# Patient Record
Sex: FEMALE | Race: WHITE | NOT HISPANIC OR LATINO | Employment: UNEMPLOYED | URBAN - METROPOLITAN AREA
[De-identification: names, ages, dates, MRNs, and addresses within clinical notes are randomized per-mention and may not be internally consistent; named-entity substitution may affect disease eponyms.]

---

## 2017-01-17 RX ORDER — LEVOTHYROXINE SODIUM 88 UG/1
88 TABLET ORAL DAILY
Qty: 90 TABLET | Refills: 1 | Status: SHIPPED | OUTPATIENT
Start: 2017-01-17 | End: 2017-02-02 | Stop reason: SDUPTHER

## 2017-02-02 RX ORDER — LEVOTHYROXINE SODIUM 88 UG/1
88 TABLET ORAL DAILY
Qty: 90 TABLET | Refills: 1 | Status: SHIPPED | OUTPATIENT
Start: 2017-02-02 | End: 2017-03-07 | Stop reason: SDUPTHER

## 2017-03-07 RX ORDER — LEVOTHYROXINE SODIUM 88 UG/1
88 TABLET ORAL DAILY
Qty: 90 TABLET | Refills: 0 | Status: SHIPPED | OUTPATIENT
Start: 2017-03-07 | End: 2017-06-08 | Stop reason: SDUPTHER

## 2017-06-08 ENCOUNTER — OFFICE VISIT (OUTPATIENT)
Dept: ENDOCRINOLOGY | Facility: CLINIC | Age: 52
End: 2017-06-08

## 2017-06-08 VITALS
BODY MASS INDEX: 24.88 KG/M2 | OXYGEN SATURATION: 98 % | HEART RATE: 80 BPM | HEIGHT: 69 IN | DIASTOLIC BLOOD PRESSURE: 80 MMHG | SYSTOLIC BLOOD PRESSURE: 130 MMHG | WEIGHT: 168 LBS

## 2017-06-08 DIAGNOSIS — E03.9 ACQUIRED HYPOTHYROIDISM: ICD-10-CM

## 2017-06-08 DIAGNOSIS — E78.2 MIXED HYPERLIPIDEMIA: Primary | ICD-10-CM

## 2017-06-08 DIAGNOSIS — E83.52 HYPERCALCEMIA: ICD-10-CM

## 2017-06-08 DIAGNOSIS — I10 ESSENTIAL HYPERTENSION: ICD-10-CM

## 2017-06-08 LAB
25(OH)D3 SERPL-MCNC: 27 NG/ML
ALBUMIN SERPL-MCNC: 4.7 G/DL (ref 3.2–4.8)
ALBUMIN/GLOB SERPL: 1.6 G/DL (ref 1.5–2.5)
ALP SERPL-CCNC: 88 U/L (ref 25–100)
ALT SERPL W P-5'-P-CCNC: 85 U/L (ref 7–40)
ANION GAP SERPL CALCULATED.3IONS-SCNC: 2 MMOL/L (ref 3–11)
ARTICHOKE IGE QN: 119 MG/DL (ref 0–130)
AST SERPL-CCNC: 40 U/L (ref 0–33)
BILIRUB SERPL-MCNC: 0.6 MG/DL (ref 0.3–1.2)
BUN BLD-MCNC: 16 MG/DL (ref 9–23)
BUN/CREAT SERPL: 20 (ref 7–25)
CA-I SERPL ISE-MCNC: 1.31 MMOL/L (ref 1.12–1.32)
CALCIUM SPEC-SCNC: 10.8 MG/DL (ref 8.7–10.4)
CHLORIDE SERPL-SCNC: 106 MMOL/L (ref 99–109)
CHOLEST SERPL-MCNC: 212 MG/DL (ref 0–200)
CO2 SERPL-SCNC: 33 MMOL/L (ref 20–31)
CREAT BLD-MCNC: 0.8 MG/DL (ref 0.6–1.3)
GFR SERPL CREATININE-BSD FRML MDRD: 75 ML/MIN/1.73
GLOBULIN UR ELPH-MCNC: 2.9 GM/DL
GLUCOSE BLD-MCNC: 88 MG/DL (ref 70–100)
HDLC SERPL-MCNC: 70 MG/DL (ref 40–60)
POTASSIUM BLD-SCNC: 4.8 MMOL/L (ref 3.5–5.5)
PROT SERPL-MCNC: 7.6 G/DL (ref 5.7–8.2)
SODIUM BLD-SCNC: 141 MMOL/L (ref 132–146)
T4 FREE SERPL-MCNC: 1.46 NG/DL (ref 0.89–1.76)
TRIGL SERPL-MCNC: 101 MG/DL (ref 0–150)
TSH SERPL DL<=0.05 MIU/L-ACNC: 0.42 MIU/ML (ref 0.35–5.35)

## 2017-06-08 PROCEDURE — 83970 ASSAY OF PARATHORMONE: CPT | Performed by: INTERNAL MEDICINE

## 2017-06-08 PROCEDURE — 80061 LIPID PANEL: CPT | Performed by: INTERNAL MEDICINE

## 2017-06-08 PROCEDURE — 84443 ASSAY THYROID STIM HORMONE: CPT | Performed by: INTERNAL MEDICINE

## 2017-06-08 PROCEDURE — 84439 ASSAY OF FREE THYROXINE: CPT | Performed by: INTERNAL MEDICINE

## 2017-06-08 PROCEDURE — 82330 ASSAY OF CALCIUM: CPT | Performed by: INTERNAL MEDICINE

## 2017-06-08 PROCEDURE — 82306 VITAMIN D 25 HYDROXY: CPT | Performed by: INTERNAL MEDICINE

## 2017-06-08 PROCEDURE — 99214 OFFICE O/P EST MOD 30 MIN: CPT | Performed by: INTERNAL MEDICINE

## 2017-06-08 PROCEDURE — 80053 COMPREHEN METABOLIC PANEL: CPT | Performed by: INTERNAL MEDICINE

## 2017-06-08 RX ORDER — LORATADINE 10 MG/1
CAPSULE, LIQUID FILLED ORAL
COMMUNITY
End: 2018-05-18

## 2017-06-08 RX ORDER — LEVOTHYROXINE SODIUM 88 UG/1
88 TABLET ORAL DAILY
Qty: 90 TABLET | Refills: 1 | Status: SHIPPED | OUTPATIENT
Start: 2017-06-08 | End: 2018-01-27 | Stop reason: SDUPTHER

## 2017-06-08 NOTE — PROGRESS NOTES
Dory Mccord 52 y.o.  CC:Follow-up and Hypothyroidism    [place]: Follow-up and Hypothyroidism    Doing well overall  Energy is good   Had high calcium at last visit   Fewer palpitations  Is watching diet, monitoring and doing cardio    Allergies   Allergen Reactions   • Oxycodone-Acetaminophen Itching       Current Outpatient Prescriptions:   •  Cyanocobalamin ER (RA VITAMIN B-12 TR) 1000 MCG tablet controlled-release, Take  by mouth., Disp: , Rfl:   •  diclofenac (VOLTAREN) 75 MG EC tablet, TAKE 1 TABLET BY MOUTH 2 TIMES A DAY AS NEEDED FOR PAIN, Disp: , Rfl: 2  •  esomeprazole (NEXIUM) 20 MG capsule, Take  by mouth., Disp: , Rfl:   •  fluticasone (FLONASE) 50 MCG/ACT nasal spray, into each nostril daily., Disp: , Rfl:   •  levothyroxine (SYNTHROID, LEVOTHROID) 88 MCG tablet, Take 1 tablet by mouth Daily., Disp: 90 tablet, Rfl: 0  •  Loratadine (CLARITIN) 10 MG capsule, Take  by mouth., Disp: , Rfl:   •  LORazepam (ATIVAN) 0.5 MG tablet, Take  by mouth., Disp: , Rfl:   •  Cholecalciferol (VITAMIN D) 1000 UNITS tablet, Take  by mouth., Disp: , Rfl:   Patient Active Problem List    Diagnosis   • Acute stress disorder [F43.0]   • Atrial premature depolarization [I49.1]     Overview Note:     Impression: 06/09/2015 - discussed with her, better off lisinopril; Description: no excess caffeine, normal stress test, normal holter o/t PACs, echo valvular heart dz     • Chest pain [R07.9]     Overview Note:     Description: 6/15 - stress test normal     • Hiatal hernia [K44.9]   • Hyperlipidemia [E78.5]     Overview Note:     Impression: 12/01/2015 - check flp  Impression: 12/10/2014 - discussed flp  mother CABG x 4 , all her side of family has had cad;      • Hypertension [I10]     Overview Note:     Impression: 12/01/2015 - bp is good today  Impression: 12/10/2014 - bp is excellent  Impression: 06/10/2014 - bp is good;      • Hypothyroidism [E03.9]     Overview Note:     Impression: 12/01/2015 - check tfts  Impression:  06/09/2015 - check tfts  Impression: 12/10/2014 - check tft  Impression: 06/10/2014 - check tft;      • Irritable bowel syndrome [K58.9]   • Uterine leiomyoma [D25.9]   • Mass of breast [N63]     Overview Note:     Description: p/o biopsy 8/6     • Calculus of kidney [N20.0]   • Obstructive sleep apnea syndrome [G47.33]     Overview Note:     Impression: 12/01/2015 - with snoring and poss apnea- she will call to get back in  to get results    discussed nasal spray and strips;      • Cyst of ovary [N83.209]   • Palpitations [R00.2]     Overview Note:     Impression: 12/01/2015 - due to apnea?; Description: s/p holter 6/15 - per cardio - try beat blocker     • Seasonal allergic rhinitis [J30.2]     Overview Note:     Impression: 06/10/2014 - use nasalcort, zyrtec;      • Vitamin D deficiency [E55.9]     Review of Systems   Constitutional: Negative for activity change, appetite change, chills, diaphoresis, fatigue, fever and unexpected weight change.   HENT: Negative for congestion, dental problem, drooling, ear discharge, ear pain, facial swelling, hearing loss, mouth sores, nosebleeds, postnasal drip, rhinorrhea, sinus pressure, sneezing, sore throat, tinnitus, trouble swallowing and voice change.    Eyes: Negative for photophobia, pain, discharge, redness, itching and visual disturbance.   Respiratory: Negative for apnea, cough, choking, chest tightness, shortness of breath, wheezing and stridor.    Cardiovascular: Negative for chest pain, palpitations and leg swelling.   Gastrointestinal: Negative for abdominal distention, abdominal pain, anal bleeding, blood in stool, constipation, diarrhea, nausea, rectal pain and vomiting.   Endocrine: Negative for cold intolerance, heat intolerance, polydipsia, polyphagia and polyuria.   Genitourinary: Negative for decreased urine volume, difficulty urinating, dysuria, enuresis, flank pain, frequency, genital sores, hematuria and urgency.   Musculoskeletal: Negative for  "arthralgias, back pain, gait problem, joint swelling, myalgias, neck pain and neck stiffness.   Skin: Negative for color change, pallor, rash and wound.   Allergic/Immunologic: Negative for environmental allergies, food allergies and immunocompromised state.   Neurological: Negative for dizziness, tremors, seizures, syncope, facial asymmetry, speech difficulty, weakness, light-headedness, numbness and headaches.   Hematological: Negative for adenopathy. Does not bruise/bleed easily.   Psychiatric/Behavioral: Negative for agitation, behavioral problems, confusion, decreased concentration, dysphoric mood, hallucinations, self-injury, sleep disturbance and suicidal ideas. The patient is not nervous/anxious and is not hyperactive.      Social History     Social History   • Marital status:      Spouse name: N/A   • Number of children: N/A   • Years of education: N/A     Occupational History   • Not on file.     Social History Main Topics   • Smoking status: Former Smoker   • Smokeless tobacco: Not on file   • Alcohol use Not on file      Comment: 4 drinks per week   • Drug use: No   • Sexual activity: Defer     Other Topics Concern   • Not on file     Social History Narrative     Family History   Problem Relation Age of Onset   • Diabetes Mother    • Heart disease Mother    • Melanoma Mother    • Cancer Father    • Cancer Maternal Grandmother    • Arthritis Other    • Breast cancer Other    • Hyperlipidemia Other    • Hypertension Other    • Lung cancer Other    • Stroke Other      /80  Pulse 80  Ht 69\" (175.3 cm)  Wt 168 lb (76.2 kg)  SpO2 98%  BMI 24.81 kg/m2  Physical Exam   Constitutional: She is oriented to person, place, and time. She appears well-developed and well-nourished.   HENT:   Head: Normocephalic and atraumatic.   Nose: Nose normal.   Mouth/Throat: Oropharynx is clear and moist.   Eyes: Conjunctivae, EOM and lids are normal. Pupils are equal, round, and reactive to light.   Neck: Trachea " normal and normal range of motion. Neck supple. Carotid bruit is not present. No tracheal deviation present. No thyroid mass and no thyromegaly present.   Cardiovascular: Normal rate, regular rhythm, normal heart sounds and intact distal pulses.  Exam reveals no gallop and no friction rub.    No murmur heard.  Pulmonary/Chest: Effort normal and breath sounds normal. No respiratory distress. She has no wheezes.   Musculoskeletal: Normal range of motion. She exhibits no edema or deformity.   Lymphadenopathy:     She has no cervical adenopathy.   Neurological: She is alert and oriented to person, place, and time. She has normal reflexes. She displays normal reflexes. No cranial nerve deficit.   Skin: Skin is warm and dry. No rash noted. No cyanosis or erythema. Nails show no clubbing.   Psychiatric: She has a normal mood and affect. Her speech is normal and behavior is normal. Judgment and thought content normal. Cognition and memory are normal.   Nursing note and vitals reviewed.    Results for orders placed or performed in visit on 12/08/16   TSH   Result Value Ref Range    TSH 7.983 (H) 0.350 - 5.350 mIU/mL   T4, Free   Result Value Ref Range    Free T4 1.24 0.89 - 1.76 ng/dL   Comprehensive Metabolic Panel   Result Value Ref Range    Glucose 88 70 - 100 mg/dL    BUN 16 9 - 23 mg/dL    Creatinine 0.80 0.60 - 1.30 mg/dL    Sodium 140 132 - 146 mmol/L    Potassium 4.6 3.5 - 5.5 mmol/L    Chloride 105 99 - 109 mmol/L    CO2 26.0 20.0 - 31.0 mmol/L    Calcium 10.8 (H) 8.7 - 10.4 mg/dL    Total Protein 7.7 5.7 - 8.2 g/dL    Albumin 4.90 (H) 3.20 - 4.80 g/dL    ALT (SGPT) 42 (H) 7 - 40 U/L    AST (SGOT) 32 0 - 33 U/L    Alkaline Phosphatase 82 25 - 100 U/L    Total Bilirubin 0.5 0.3 - 1.2 mg/dL    eGFR Non African Amer 75 >60 mL/min/1.73    Globulin 2.8 gm/dL    A/G Ratio 1.8 g/dL    BUN/Creatinine Ratio 20.0 7.0 - 25.0    Anion Gap 9.0 3.0 - 11.0 mmol/L   Lipid Panel   Result Value Ref Range    Total Cholesterol 225 (H)  0 - 200 mg/dL    Triglycerides 76 0 - 150 mg/dL    HDL Cholesterol 74 (H) 40 - 60 mg/dL    LDL Cholesterol  139 (H) 0 - 130 mg/dL   Vitamin D 25 Hydroxy   Result Value Ref Range    25 Hydroxy, Vitamin D 30.8 ng/ml   CBC Auto Differential   Result Value Ref Range    WBC 5.14 3.50 - 10.80 10*3/mm3    RBC 4.60 3.89 - 5.14 10*6/mm3    Hemoglobin 13.9 11.5 - 15.5 g/dL    Hematocrit 40.9 34.5 - 44.0 %    MCV 88.9 80.0 - 99.0 fL    MCH 30.2 27.0 - 31.0 pg    MCHC 34.0 32.0 - 36.0 g/dL    RDW 12.3 11.3 - 14.5 %    RDW-SD 39.8 37.0 - 54.0 fl    MPV 11.3 6.0 - 12.0 fL    Platelets 210 150 - 450 10*3/mm3    Neutrophil % 54.6 41.0 - 71.0 %    Lymphocyte % 33.9 24.0 - 44.0 %    Monocyte % 9.3 0.0 - 12.0 %    Eosinophil % 1.2 0.0 - 3.0 %    Basophil % 0.8 0.0 - 1.0 %    Immature Grans % 0.2 0.0 - 0.6 %    Neutrophils, Absolute 2.81 1.50 - 8.30 10*3/mm3    Lymphocytes, Absolute 1.74 0.60 - 4.80 10*3/mm3    Monocytes, Absolute 0.48 0.00 - 1.00 10*3/mm3    Eosinophils, Absolute 0.06 (L) 0.10 - 0.30 10*3/mm3    Basophils, Absolute 0.04 0.00 - 0.20 10*3/mm3    Immature Grans, Absolute 0.01 0.00 - 0.03 10*3/mm3     Problem List Items Addressed This Visit        Cardiovascular and Mediastinum    Hyperlipidemia - Primary     Is following a low fat diet            Hypertension     bp is good today   Continue to monitor   She is not feeling extra beats currently   Is doing cardio 3-4 days a week          Relevant Orders    Lipid Panel       Endocrine    Hypothyroidism     Check tfts          Relevant Medications    levothyroxine (SYNTHROID, LEVOTHROID) 88 MCG tablet    Other Relevant Orders    TSH    T4, Free    Calcium, Ionized      Other Visit Diagnoses     Hypercalcemia        Relevant Orders    Comprehensive Metabolic Panel    Calcium, Ionized    PTH, Intact    Vitamin D 25 Hydroxy        Return in about 6 months (around 12/8/2017) for Recheck 30 min .    Peyton Jones MA  Signed Berta Loja MD

## 2017-06-08 NOTE — ASSESSMENT & PLAN NOTE
bp is good today   Continue to monitor   She is not feeling extra beats currently   Is doing cardio 3-4 days a week

## 2017-06-09 LAB — PTH-INTACT SERPL-MCNC: 48 PG/ML (ref 15–65)

## 2017-12-05 ENCOUNTER — TELEPHONE (OUTPATIENT)
Dept: INTERNAL MEDICINE | Facility: CLINIC | Age: 52
End: 2017-12-05

## 2017-12-05 NOTE — TELEPHONE ENCOUNTER
PT IS SICK, JANEEN IS FULL. IF YOU CAN FIT HER ANY WHERE, IF NOT ILL JUST SCHEDULE HER IN FIRST AVAILABLE

## 2017-12-31 RX ORDER — LEVOTHYROXINE SODIUM 88 UG/1
TABLET ORAL
Qty: 90 TABLET | Refills: 0 | Status: SHIPPED | OUTPATIENT
Start: 2017-12-31 | End: 2018-01-15 | Stop reason: SDUPTHER

## 2018-01-15 ENCOUNTER — OFFICE VISIT (OUTPATIENT)
Dept: ENDOCRINOLOGY | Facility: CLINIC | Age: 53
End: 2018-01-15

## 2018-01-15 VITALS
WEIGHT: 174 LBS | HEART RATE: 77 BPM | BODY MASS INDEX: 25.77 KG/M2 | OXYGEN SATURATION: 99 % | SYSTOLIC BLOOD PRESSURE: 138 MMHG | DIASTOLIC BLOOD PRESSURE: 70 MMHG | HEIGHT: 69 IN

## 2018-01-15 DIAGNOSIS — I10 ESSENTIAL HYPERTENSION: ICD-10-CM

## 2018-01-15 DIAGNOSIS — E03.9 ACQUIRED HYPOTHYROIDISM: Primary | ICD-10-CM

## 2018-01-15 LAB
T4 FREE SERPL-MCNC: 1.03 NG/DL (ref 0.89–1.76)
TSH SERPL DL<=0.05 MIU/L-ACNC: 15.29 MIU/ML (ref 0.35–5.35)

## 2018-01-15 PROCEDURE — 99213 OFFICE O/P EST LOW 20 MIN: CPT | Performed by: INTERNAL MEDICINE

## 2018-01-15 PROCEDURE — 84443 ASSAY THYROID STIM HORMONE: CPT | Performed by: INTERNAL MEDICINE

## 2018-01-15 PROCEDURE — 86376 MICROSOMAL ANTIBODY EACH: CPT | Performed by: INTERNAL MEDICINE

## 2018-01-15 PROCEDURE — 84439 ASSAY OF FREE THYROXINE: CPT | Performed by: INTERNAL MEDICINE

## 2018-01-15 PROCEDURE — 86800 THYROGLOBULIN ANTIBODY: CPT | Performed by: INTERNAL MEDICINE

## 2018-01-15 RX ORDER — CIPROFLOXACIN 500 MG/1
TABLET, FILM COATED ORAL
Refills: 0 | COMMUNITY
Start: 2018-01-08 | End: 2019-01-15

## 2018-01-15 NOTE — PROGRESS NOTES
Dory Suri 52 y.o.  CC:Follow-up and Hypothyroidism      [place]: Follow-up and Hypothyroidism    Chief Complaint   Patient presents with   • Follow-up   • Hypothyroidism   also struggling with acute on chronic sinus problem- has had 2 round of antibiotic   Green drainage with foul odor (took omnicef, horrible pain and pressure and cipro added )  ENT discussed and she is considering   Energy is not as good (related to above).    Allergies   Allergen Reactions   • Oxycodone-Acetaminophen Itching       Current Outpatient Prescriptions:   •  Cholecalciferol (VITAMIN D) 1000 UNITS tablet, Take  by mouth., Disp: , Rfl:   •  Cyanocobalamin ER (RA VITAMIN B-12 TR) 1000 MCG tablet controlled-release, Take  by mouth., Disp: , Rfl:   •  esomeprazole (NEXIUM) 20 MG capsule, Take  by mouth., Disp: , Rfl:   •  fluticasone (FLONASE) 50 MCG/ACT nasal spray, into each nostril daily., Disp: , Rfl:   •  HYDROcodone-homatropine (HYCODAN) 5-1.5 MG/5ML syrup, TAKE 1 TEASPOONFUL BY MOUTH EVERY 6 HOURS AS NEEDED FOR COUGH, Disp: , Rfl: 0  •  levothyroxine (SYNTHROID, LEVOTHROID) 88 MCG tablet, Take 1 tablet by mouth Daily., Disp: 90 tablet, Rfl: 1  •  Loratadine (CLARITIN) 10 MG capsule, Take  by mouth., Disp: , Rfl:   •  LORazepam (ATIVAN) 0.5 MG tablet, Take  by mouth., Disp: , Rfl:   Patient Active Problem List    Diagnosis   • Acute stress disorder [F43.0]   • Atrial premature depolarization [I49.1]     Overview Note:     Impression: 06/09/2015 - discussed with her, better off lisinopril; Description: no excess caffeine, normal stress test, normal holter o/t PACs, echo valvular heart dz     • Chest pain [R07.9]     Overview Note:     Description: 6/15 - stress test normal     • Hiatal hernia [K44.9]   • Hyperlipidemia [E78.5]     Overview Note:     Impression: 12/01/2015 - check flp  Impression: 12/10/2014 - discussed flp  mother CABG x 4 , all her side of family has had cad;      • Hypertension [I10]     Overview Note:      Impression: 12/01/2015 - bp is good today  Impression: 12/10/2014 - bp is excellent  Impression: 06/10/2014 - bp is good;      • Hypothyroidism [E03.9]     Overview Note:     Impression: 12/01/2015 - check tfts  Impression: 06/09/2015 - check tfts  Impression: 12/10/2014 - check tft  Impression: 06/10/2014 - check tft;      • Irritable bowel syndrome [K58.9]   • Uterine leiomyoma [D25.9]   • Mass of breast [N63.0]     Overview Note:     Description: p/o biopsy 8/6     • Calculus of kidney [N20.0]   • Obstructive sleep apnea syndrome [G47.33]     Overview Note:     Impression: 12/01/2015 - with snoring and poss apnea- she will call to get back in  to get results    discussed nasal spray and strips;      • Cyst of ovary [N83.209]   • Palpitations [R00.2]     Overview Note:     Impression: 12/01/2015 - due to apnea?; Description: s/p holter 6/15 - per cardio - try beat blocker     • Seasonal allergic rhinitis [J30.2]     Overview Note:     Impression: 06/10/2014 - use nasalcort, zyrtec;      • Vitamin D deficiency [E55.9]     Review of Systems   Constitutional: Positive for fatigue. Negative for activity change, appetite change, chills, diaphoresis, fever and unexpected weight change.   HENT: Positive for sinus pain and sinus pressure. Negative for congestion, dental problem, drooling, ear discharge, ear pain, facial swelling, hearing loss, mouth sores, nosebleeds, postnasal drip, rhinorrhea, sneezing, sore throat, tinnitus, trouble swallowing and voice change.    Eyes: Negative for photophobia, pain, discharge, redness, itching and visual disturbance.   Respiratory: Negative for apnea, cough, choking, chest tightness, shortness of breath, wheezing and stridor.    Cardiovascular: Negative for chest pain, palpitations and leg swelling.   Gastrointestinal: Negative for abdominal distention, abdominal pain, anal bleeding, blood in stool, constipation, diarrhea, nausea, rectal pain and vomiting.   Endocrine: Negative for  "cold intolerance, heat intolerance, polydipsia, polyphagia and polyuria.   Genitourinary: Negative for decreased urine volume, difficulty urinating, dysuria, enuresis, flank pain, frequency, genital sores, hematuria and urgency.   Musculoskeletal: Negative for arthralgias, back pain, gait problem, joint swelling, myalgias, neck pain and neck stiffness.   Skin: Negative for color change, pallor, rash and wound.   Allergic/Immunologic: Negative for environmental allergies, food allergies and immunocompromised state.   Neurological: Negative for dizziness, tremors, seizures, syncope, facial asymmetry, speech difficulty, weakness, light-headedness, numbness and headaches.   Hematological: Negative for adenopathy. Does not bruise/bleed easily.   Psychiatric/Behavioral: Negative for agitation, behavioral problems, confusion, decreased concentration, dysphoric mood, hallucinations, self-injury, sleep disturbance and suicidal ideas. The patient is not nervous/anxious and is not hyperactive.      Social History     Social History   • Marital status:      Spouse name: N/A   • Number of children: N/A   • Years of education: N/A     Occupational History   • Not on file.     Social History Main Topics   • Smoking status: Former Smoker   • Smokeless tobacco: Never Used   • Alcohol use Yes      Comment: 4 drinks per week   • Drug use: No   • Sexual activity: Defer     Other Topics Concern   • Not on file     Social History Narrative     Family History   Problem Relation Age of Onset   • Diabetes Mother    • Heart disease Mother    • Melanoma Mother    • Cancer Father    • Cancer Maternal Grandmother    • Arthritis Other    • Breast cancer Other    • Hyperlipidemia Other    • Hypertension Other    • Lung cancer Other    • Stroke Other      /70  Pulse 77  Ht 175.3 cm (69\")  Wt 78.9 kg (174 lb)  SpO2 99%  BMI 25.7 kg/m2  Physical Exam   Constitutional: She is oriented to person, place, and time. She appears " well-developed and well-nourished.   HENT:   Head: Normocephalic and atraumatic.   Nose: Nose normal.   Mouth/Throat: Oropharynx is clear and moist.   Eyes: Conjunctivae, EOM and lids are normal. Pupils are equal, round, and reactive to light.   Neck: Trachea normal and normal range of motion. Neck supple. Carotid bruit is not present. No tracheal deviation present. No thyroid mass and no thyromegaly present.   Cardiovascular: Normal rate, regular rhythm, normal heart sounds and intact distal pulses.  Exam reveals no gallop and no friction rub.    No murmur heard.  Pulmonary/Chest: Effort normal and breath sounds normal. No respiratory distress. She has no wheezes.   Musculoskeletal: Normal range of motion. She exhibits no edema or deformity.   Lymphadenopathy:     She has no cervical adenopathy.   Neurological: She is alert and oriented to person, place, and time. She has normal reflexes. She displays normal reflexes. No cranial nerve deficit.   Skin: Skin is warm and dry. No rash noted. No cyanosis or erythema. Nails show no clubbing.   Psychiatric: She has a normal mood and affect. Her speech is normal and behavior is normal. Judgment and thought content normal. Cognition and memory are normal.   Nursing note and vitals reviewed.    Results for orders placed or performed in visit on 06/08/17   TSH   Result Value Ref Range    TSH 0.420 0.350 - 5.350 mIU/mL   T4, Free   Result Value Ref Range    Free T4 1.46 0.89 - 1.76 ng/dL   Comprehensive Metabolic Panel   Result Value Ref Range    Glucose 88 70 - 100 mg/dL    BUN 16 9 - 23 mg/dL    Creatinine 0.80 0.60 - 1.30 mg/dL    Sodium 141 132 - 146 mmol/L    Potassium 4.8 3.5 - 5.5 mmol/L    Chloride 106 99 - 109 mmol/L    CO2 33.0 (H) 20.0 - 31.0 mmol/L    Calcium 10.8 (H) 8.7 - 10.4 mg/dL    Total Protein 7.6 5.7 - 8.2 g/dL    Albumin 4.70 3.20 - 4.80 g/dL    ALT (SGPT) 85 (H) 7 - 40 U/L    AST (SGOT) 40 (H) 0 - 33 U/L    Alkaline Phosphatase 88 25 - 100 U/L    Total  Bilirubin 0.6 0.3 - 1.2 mg/dL    eGFR Non African Amer 75 >60 mL/min/1.73    Globulin 2.9 gm/dL    A/G Ratio 1.6 1.5 - 2.5 g/dL    BUN/Creatinine Ratio 20.0 7.0 - 25.0    Anion Gap 2.0 (L) 3.0 - 11.0 mmol/L   Calcium, Ionized   Result Value Ref Range    Ionized Calcium 1.31 1.12 - 1.32 mmol/L   PTH, Intact   Result Value Ref Range    PTH, Intact 48 15 - 65 pg/mL   Lipid Panel   Result Value Ref Range    Total Cholesterol 212 (H) 0 - 200 mg/dL    Triglycerides 101 0 - 150 mg/dL    HDL Cholesterol 70 (H) 40 - 60 mg/dL    LDL Cholesterol  119 0 - 130 mg/dL   Vitamin D 25 Hydroxy   Result Value Ref Range    25 Hydroxy, Vitamin D 27.0 ng/ml     Problem List Items Addressed This Visit        Cardiovascular and Mediastinum    Hypertension     bp is currently well controlled         Relevant Orders    TSH    T4, Free    Thyroid Antibodies       Endocrine    Hypothyroidism - Primary     Check tft and abs         Relevant Orders    TSH    T4, Free    Thyroid Antibodies        Return in about 1 year (around 1/15/2019) for Recheck 30 min .    Peyton Jones MA  Signed Berta Loja MD

## 2018-01-17 LAB
THYROGLOB AB SERPL-ACNC: 3.4 IU/ML (ref 0–0.9)
THYROPEROXIDASE AB SERPL-ACNC: 12 IU/ML (ref 0–34)

## 2018-01-26 ENCOUNTER — TELEPHONE (OUTPATIENT)
Dept: INTERNAL MEDICINE | Facility: CLINIC | Age: 53
End: 2018-01-26

## 2018-01-26 DIAGNOSIS — E03.9 ACQUIRED HYPOTHYROIDISM: Primary | ICD-10-CM

## 2018-01-26 NOTE — TELEPHONE ENCOUNTER
Pt received her lab letter and TSH was elevated at 15 and she was advised to restart the synthroid, however she states she never stopped it.  She has been taking 88mcg and does not miss any doses.  She is also concerned that all the chemistries were not ordered this time because she thinks it is always ordered and she has been so tired.  She was advised she is tired due to the TSH being elevated  Pt advised we will call her Monday with an updated dosage  Please advise

## 2018-01-26 NOTE — TELEPHONE ENCOUNTER
PT RECEIVED HER LAB RESULTS AND HAS A FEW QUESTIONS REGARDING HER RESULTS.  PLEASE CALL [facility] [phone]

## 2018-01-27 RX ORDER — LEVOTHYROXINE SODIUM 112 UG/1
112 TABLET ORAL DAILY
Qty: 90 TABLET | Refills: 1 | Status: SHIPPED | OUTPATIENT
Start: 2018-01-27 | End: 2018-04-21 | Stop reason: SDUPTHER

## 2018-01-27 NOTE — TELEPHONE ENCOUNTER
New prescription for 0.112 mg levothyroxine was sent to her pharmacy.  Repeat blood work including chemistries in 8 weeks - order already created.  Thanks, js

## 2018-01-30 NOTE — TELEPHONE ENCOUNTER
Patient was advised to increase dosage of levothyroxine and to come in 8 weeks to have labs repeated  Pt voiced understanding

## 2018-04-09 ENCOUNTER — TELEPHONE (OUTPATIENT)
Dept: INTERNAL MEDICINE | Facility: CLINIC | Age: 53
End: 2018-04-09

## 2018-04-09 DIAGNOSIS — E03.9 ACQUIRED HYPOTHYROIDISM: Primary | ICD-10-CM

## 2018-04-09 DIAGNOSIS — E78.2 MIXED HYPERLIPIDEMIA: ICD-10-CM

## 2018-04-09 DIAGNOSIS — E55.9 VITAMIN D DEFICIENCY: ICD-10-CM

## 2018-04-09 DIAGNOSIS — I10 ESSENTIAL HYPERTENSION: ICD-10-CM

## 2018-04-09 NOTE — TELEPHONE ENCOUNTER
PT WANTED TO LEAVE A MESSAGE FOR CHANCE. THE PT IS SCHEDULED TO HAVE LABS DONE ORDERED BY DR. VERNON. THE PT WAS WANTING TO SEE IF POSSIBLE IF SHE COULD HAVE LABS FOR FASTING CHOLESTEROL & FASTING BLOOD SUGAR. HOPING TO SEE IF SHE COULD COME IN Thursday MORNING IF POSSIBLE. BEST CALL BACK # [phone]

## 2018-04-12 LAB
25(OH)D3 SERPL-MCNC: 24.1 NG/ML
ALBUMIN SERPL-MCNC: 4.3 G/DL (ref 3.2–4.8)
ALBUMIN/GLOB SERPL: 1.7 G/DL (ref 1.5–2.5)
ALP SERPL-CCNC: 66 U/L (ref 25–100)
ALT SERPL W P-5'-P-CCNC: 38 U/L (ref 7–40)
ANION GAP SERPL CALCULATED.3IONS-SCNC: 7 MMOL/L (ref 3–11)
ARTICHOKE IGE QN: 101 MG/DL (ref 0–130)
AST SERPL-CCNC: 27 U/L (ref 0–33)
BASOPHILS # BLD AUTO: 0.03 10*3/MM3 (ref 0–0.2)
BASOPHILS NFR BLD AUTO: 0.7 % (ref 0–1)
BILIRUB SERPL-MCNC: 0.6 MG/DL (ref 0.3–1.2)
BUN BLD-MCNC: 12 MG/DL (ref 9–23)
BUN/CREAT SERPL: 15 (ref 7–25)
CALCIUM SPEC-SCNC: 9.8 MG/DL (ref 8.7–10.4)
CHLORIDE SERPL-SCNC: 107 MMOL/L (ref 99–109)
CHOLEST SERPL-MCNC: 185 MG/DL (ref 0–200)
CO2 SERPL-SCNC: 30 MMOL/L (ref 20–31)
CREAT BLD-MCNC: 0.8 MG/DL (ref 0.6–1.3)
DEPRECATED RDW RBC AUTO: 40 FL (ref 37–54)
EOSINOPHIL # BLD AUTO: 0.04 10*3/MM3 (ref 0–0.3)
EOSINOPHIL NFR BLD AUTO: 0.9 % (ref 0–3)
ERYTHROCYTE [DISTWIDTH] IN BLOOD BY AUTOMATED COUNT: 12.4 % (ref 11.3–14.5)
GFR SERPL CREATININE-BSD FRML MDRD: 75 ML/MIN/1.73
GLOBULIN UR ELPH-MCNC: 2.5 GM/DL
GLUCOSE BLD-MCNC: 94 MG/DL (ref 70–100)
HCT VFR BLD AUTO: 40.2 % (ref 34.5–44)
HDLC SERPL-MCNC: 65 MG/DL (ref 40–60)
HGB BLD-MCNC: 13.3 G/DL (ref 11.5–15.5)
IMM GRANULOCYTES # BLD: 0.02 10*3/MM3 (ref 0–0.03)
IMM GRANULOCYTES NFR BLD: 0.4 % (ref 0–0.6)
LYMPHOCYTES # BLD AUTO: 1.65 10*3/MM3 (ref 0.6–4.8)
LYMPHOCYTES NFR BLD AUTO: 35.9 % (ref 24–44)
MCH RBC QN AUTO: 29.5 PG (ref 27–31)
MCHC RBC AUTO-ENTMCNC: 33.1 G/DL (ref 32–36)
MCV RBC AUTO: 89.1 FL (ref 80–99)
MONOCYTES # BLD AUTO: 0.43 10*3/MM3 (ref 0–1)
MONOCYTES NFR BLD AUTO: 9.3 % (ref 0–12)
NEUTROPHILS # BLD AUTO: 2.43 10*3/MM3 (ref 1.5–8.3)
NEUTROPHILS NFR BLD AUTO: 52.8 % (ref 41–71)
PLATELET # BLD AUTO: 191 10*3/MM3 (ref 150–450)
PMV BLD AUTO: 11.6 FL (ref 6–12)
POTASSIUM BLD-SCNC: 5.2 MMOL/L (ref 3.5–5.5)
PROT SERPL-MCNC: 6.8 G/DL (ref 5.7–8.2)
RBC # BLD AUTO: 4.51 10*6/MM3 (ref 3.89–5.14)
SODIUM BLD-SCNC: 144 MMOL/L (ref 132–146)
T4 FREE SERPL-MCNC: 2.14 NG/DL (ref 0.89–1.76)
TRIGL SERPL-MCNC: 67 MG/DL (ref 0–150)
TSH SERPL DL<=0.05 MIU/L-ACNC: 0.03 MIU/ML (ref 0.35–5.35)
WBC NRBC COR # BLD: 4.6 10*3/MM3 (ref 3.5–10.8)

## 2018-04-12 PROCEDURE — 84443 ASSAY THYROID STIM HORMONE: CPT | Performed by: INTERNAL MEDICINE

## 2018-04-12 PROCEDURE — 85025 COMPLETE CBC W/AUTO DIFF WBC: CPT | Performed by: INTERNAL MEDICINE

## 2018-04-12 PROCEDURE — 80053 COMPREHEN METABOLIC PANEL: CPT | Performed by: INTERNAL MEDICINE

## 2018-04-12 PROCEDURE — 82306 VITAMIN D 25 HYDROXY: CPT | Performed by: INTERNAL MEDICINE

## 2018-04-12 PROCEDURE — 80061 LIPID PANEL: CPT | Performed by: INTERNAL MEDICINE

## 2018-04-12 PROCEDURE — 84439 ASSAY OF FREE THYROXINE: CPT | Performed by: INTERNAL MEDICINE

## 2018-04-21 ENCOUNTER — TELEPHONE (OUTPATIENT)
Dept: ENDOCRINOLOGY | Facility: CLINIC | Age: 53
End: 2018-04-21

## 2018-04-21 RX ORDER — LEVOTHYROXINE SODIUM 0.1 MG/1
100 TABLET ORAL DAILY
Qty: 30 TABLET | Refills: 5 | Status: SHIPPED | OUTPATIENT
Start: 2018-04-21 | End: 2018-07-17 | Stop reason: SDUPTHER

## 2018-05-18 ENCOUNTER — OFFICE VISIT (OUTPATIENT)
Dept: RETAIL CLINIC | Facility: CLINIC | Age: 53
End: 2018-05-18

## 2018-05-18 VITALS
TEMPERATURE: 100.3 F | WEIGHT: 163 LBS | SYSTOLIC BLOOD PRESSURE: 155 MMHG | BODY MASS INDEX: 24.14 KG/M2 | DIASTOLIC BLOOD PRESSURE: 65 MMHG | OXYGEN SATURATION: 97 % | HEART RATE: 135 BPM | HEIGHT: 69 IN | RESPIRATION RATE: 20 BRPM

## 2018-05-18 DIAGNOSIS — N39.0 URINARY TRACT INFECTION WITH HEMATURIA, SITE UNSPECIFIED: ICD-10-CM

## 2018-05-18 DIAGNOSIS — R68.89 FLU-LIKE SYMPTOMS: ICD-10-CM

## 2018-05-18 DIAGNOSIS — J06.9 ACUTE URI: Primary | ICD-10-CM

## 2018-05-18 DIAGNOSIS — R31.9 URINARY TRACT INFECTION WITH HEMATURIA, SITE UNSPECIFIED: ICD-10-CM

## 2018-05-18 LAB
BILIRUB BLD-MCNC: NEGATIVE MG/DL
CLARITY, POC: CLEAR
COLOR UR: YELLOW
EXPIRATION DATE: NORMAL
FLUAV AG NPH QL: NORMAL
FLUBV AG NPH QL: NEGATIVE
GLUCOSE UR STRIP-MCNC: NEGATIVE MG/DL
INTERNAL CONTROL: NORMAL
KETONES UR QL: NEGATIVE
LEUKOCYTE EST, POC: ABNORMAL
Lab: NORMAL
NITRITE UR-MCNC: NEGATIVE MG/ML
PH UR: 0.2 [PH] (ref 5–8)
PROT UR STRIP-MCNC: NEGATIVE MG/DL
RBC # UR STRIP: ABNORMAL /UL
SP GR UR: 1.01 (ref 1–1.03)
UROBILINOGEN UR QL: NORMAL

## 2018-05-18 PROCEDURE — 87804 INFLUENZA ASSAY W/OPTIC: CPT | Performed by: NURSE PRACTITIONER

## 2018-05-18 PROCEDURE — 99213 OFFICE O/P EST LOW 20 MIN: CPT | Performed by: NURSE PRACTITIONER

## 2018-05-18 PROCEDURE — 81003 URINALYSIS AUTO W/O SCOPE: CPT | Performed by: NURSE PRACTITIONER

## 2018-05-18 RX ORDER — CEPHALEXIN 500 MG/1
500 CAPSULE ORAL 2 TIMES DAILY
Qty: 14 CAPSULE | Refills: 0 | Status: SHIPPED | OUTPATIENT
Start: 2018-05-18 | End: 2018-05-25

## 2018-05-18 NOTE — PROGRESS NOTES
Subjective   Dory Mccord is a 52 y.o. female.     URI    This is a new problem. The current episode started in the past 7 days (2or3 days). The problem has been unchanged. Associated symptoms include abdominal pain, congestion, coughing, headaches, nausea, a plugged ear sensation, rhinorrhea and sneezing. Pertinent negatives include no chest pain, diarrhea, dysuria, ear pain, joint pain, joint swelling, neck pain, rash, sinus pain, sore throat, swollen glands, vomiting or wheezing. Treatments tried: nyquil. The treatment provided no relief.   Abdominal Pain   This is a new problem. The current episode started in the past 7 days (5d). The onset quality is sudden. The problem occurs constantly (worsened last night). The problem has been waxing and waning. The pain is at a severity of 9/10. The pain is severe. The quality of the pain is colicky and sharp. The abdominal pain radiates to the LLQ, LUQ, RLQ and RUQ (pain shoots toward rectum). Associated symptoms include a fever, frequency, headaches and nausea. Pertinent negatives include no anorexia, arthralgias, belching, constipation, diarrhea, dysuria, flatus, hematochezia, hematuria, melena, myalgias, vomiting or weight loss. Nothing aggravates the pain. The pain is relieved by nothing. She has tried acetaminophen for the symptoms. The treatment provided no relief. There is no history of abdominal surgery, colon cancer, Crohn's disease, gallstones, GERD, irritable bowel syndrome, pancreatitis, PUD or ulcerative colitis. kidney stone hx        Current Outpatient Prescriptions on File Prior to Visit   Medication Sig Dispense Refill   • Cholecalciferol (VITAMIN D) 1000 UNITS tablet Take  by mouth.     • Cyanocobalamin ER (RA VITAMIN B-12 TR) 1000 MCG tablet controlled-release Take  by mouth.     • esomeprazole (NEXIUM) 20 MG capsule Take  by mouth.     • fluticasone (FLONASE) 50 MCG/ACT nasal spray into each nostril daily.     • HYDROcodone-homatropine (HYCODAN) 5-1.5  MG/5ML syrup TAKE 1 TEASPOONFUL BY MOUTH EVERY 6 HOURS AS NEEDED FOR COUGH  0   • levothyroxine (SYNTHROID, LEVOTHROID) 100 MCG tablet Take 1 tablet by mouth Daily. 30 tablet 5   • LORazepam (ATIVAN) 0.5 MG tablet Take  by mouth.     • [DISCONTINUED] Loratadine (CLARITIN) 10 MG capsule Take  by mouth.     • ciprofloxacin (CIPRO) 500 MG tablet TAKE 1 TABLET BY MOUTH EVERY 12 HOURS FOR 10 DAYS  0     No current facility-administered medications on file prior to visit.        Allergies   Allergen Reactions   • Oxycodone-Acetaminophen Itching       Past Medical History:   Diagnosis Date   • Acute reaction to stress    • Acute suppurative otitis media    • Hiatal hernia    • History of stress test     chest pain. normal   • Hypertension    • Hypothyroidism    • Leiomyoma of uterus    • Lump or mass in breast      p/o biopsy    • Nephrolithiasis    • Ovarian cyst    • Palpitations      s/p holter 6/15 - per cardio - try beat blocker   • Seasonal allergies        Past Surgical History:   Procedure Laterality Date   • BREAST SURGERY      biopsy    •  SECTION       x 2 ,    • HYSTERECTOMY      With Endometrial Ablation   • KIDNEY SURGERY  2013     Lithotripsy   • OTHER SURGICAL HISTORY      Gyn Laparoscopy With Adhesiolysis Broad Ligaments. ,        Family History   Problem Relation Age of Onset   • Diabetes Mother    • Heart disease Mother    • Melanoma Mother    • Cancer Father    • Cancer Maternal Grandmother    • Arthritis Other    • Breast cancer Other    • Hyperlipidemia Other    • Hypertension Other    • Lung cancer Other    • Stroke Other        Social History     Social History   • Marital status:      Spouse name: N/A   • Number of children: N/A   • Years of education: N/A     Occupational History   • Not on file.     Social History Main Topics   • Smoking status: Former Smoker   • Smokeless tobacco: Never Used   • Alcohol use Yes      Comment: 4 drinks per week   • Drug  "use: No   • Sexual activity: Defer     Other Topics Concern   • Not on file     Social History Narrative   • No narrative on file       Review of Systems   Constitutional: Positive for chills, diaphoresis, fatigue and fever. Negative for activity change, appetite change and weight loss.   HENT: Positive for congestion, rhinorrhea and sneezing. Negative for ear pain, sinus pain, sore throat and voice change.    Eyes: Negative for pain, discharge, redness and itching.   Respiratory: Positive for cough. Negative for chest tightness, shortness of breath and wheezing.    Cardiovascular: Negative for chest pain.   Gastrointestinal: Positive for abdominal pain and nausea. Negative for anorexia, constipation, diarrhea, flatus, hematochezia, melena and vomiting.   Genitourinary: Positive for frequency, pelvic pain (tenderness) and urgency. Negative for dysuria, flank pain and hematuria.   Musculoskeletal: Negative for arthralgias, joint pain, myalgias and neck pain.   Skin: Negative for rash.   Allergic/Immunologic: Positive for environmental allergies.   Neurological: Positive for dizziness (sinus related) and headaches. Negative for light-headedness.       /65   Pulse (!) 135   Temp 100.3 °F (37.9 °C) (Oral)   Resp 20   Ht 175.3 cm (69\")   Wt 73.9 kg (163 lb)   SpO2 97%   BMI 24.07 kg/m²     Objective   Physical Exam   Constitutional: She is oriented to person, place, and time. She appears well-developed and well-nourished. She is cooperative. She appears ill.   HENT:   Head: Normocephalic and atraumatic.   Right Ear: Tympanic membrane, external ear and ear canal normal.   Left Ear: Tympanic membrane, external ear and ear canal normal.   Nose: Mucosal edema, rhinorrhea and sinus tenderness present. Right sinus exhibits no maxillary sinus tenderness and no frontal sinus tenderness. Left sinus exhibits maxillary sinus tenderness and frontal sinus tenderness.   Mouth/Throat: Uvula is midline, oropharynx is clear " and moist and mucous membranes are normal. No oropharyngeal exudate, posterior oropharyngeal edema or posterior oropharyngeal erythema.   Eyes: Conjunctivae and lids are normal.   Cardiovascular: Normal heart sounds.    Pulmonary/Chest: Effort normal and breath sounds normal.   Abdominal: Soft. Bowel sounds are increased. There is tenderness in the suprapubic area. There is no rigidity, no rebound, no guarding, no CVA tenderness, no tenderness at McBurney's point and negative Monroe's sign.   Lymphadenopathy:     She has cervical adenopathy.   Neurological: She is alert and oriented to person, place, and time.   Skin: Skin is warm and dry. No rash noted.   Psychiatric: She has a normal mood and affect. Her speech is normal and behavior is normal.       Procedures None    Assessment/Plan   Diagnoses and all orders for this visit:    Acute URI  -     POC Influenza A / B    Urinary tract infection with hematuria, site unspecified  -     Urine Culture - Urine, Urine, Clean Catch  -     POC Urinalysis Dipstick, Automated  -     cephalexin (KEFLEX) 500 MG capsule; Take 1 capsule by mouth 2 (Two) Times a Day for 7 days.    Flu-like symptoms  -     POC Influenza A / B        Results for orders placed or performed in visit on 05/18/18   POC Influenza A / B   Result Value Ref Range    Rapid Influenza A Ag negaitve     Rapid Influenza B Ag negative     Internal Control Passed Passed    Lot Number [id number]     Expiration Date 11/20    POC Urinalysis Dipstick, Automated   Result Value Ref Range    Color Yellow Yellow, Straw, Dark Yellow, Melinda    Clarity, UA Clear Clear    Glucose, UA Negative Negative, 1000 mg/dL (3+) mg/dL    Bilirubin Negative Negative    Ketones, UA Negative Negative    Specific Gravity  1.015 1.005 - 1.030    Blood, UA Small (A) Negative    pH, Urine 0.2 (A) 5.0 - 8.0    Protein, POC Negative Negative mg/dL    Urobilinogen, UA Normal Normal    Leukocytes Trace (A) Negative    Nitrite, UA Negative Negative        Follow up with PCP or go to the nearest emergency room if symptoms worsen or fail to improve.

## 2018-05-18 NOTE — PATIENT INSTRUCTIONS
Urinary Tract Infection, Adult  A urinary tract infection (UTI) is an infection of any part of the urinary tract, which includes the kidneys, ureters, bladder, and urethra. These organs make, store, and get rid of urine in the body. UTI can be a bladder infection (cystitis) or kidney infection (pyelonephritis).  What are the causes?  This infection may be caused by fungi, viruses, or bacteria. Bacteria are the most common cause of UTIs. This condition can also be caused by repeated incomplete emptying of the bladder during urination.  What increases the risk?  This condition is more likely to develop if:  · You ignore your need to urinate or hold urine for long periods of time.  · You do not empty your bladder completely during urination.  · You wipe back to front after urinating or having a bowel movement, if you are female.  · You are uncircumcised, if you are male.  · You are constipated.  · You have a urinary catheter that stays in place (indwelling).  · You have a weak defense (immune) system.  · You have a medical condition that affects your bowels, kidneys, or bladder.  · You have diabetes.  · You take antibiotic medicines frequently or for long periods of time, and the antibiotics no longer work well against certain types of infections (antibiotic resistance).  · You take medicines that irritate your urinary tract.  · You are exposed to chemicals that irritate your urinary tract.  · You are female.  What are the signs or symptoms?  Symptoms of this condition include:  · Fever.  · Frequent urination or passing small amounts of urine frequently.  · Needing to urinate urgently.  · Pain or burning with urination.  · Urine that smells bad or unusual.  · Cloudy urine.  · Pain in the lower abdomen or back.  · Trouble urinating.  · Blood in the urine.  · Vomiting or being less hungry than normal.  · Diarrhea or abdominal pain.  · Vaginal discharge, if you are female.  How is this diagnosed?  This condition is  diagnosed with a medical history and physical exam. You will also need to provide a urine sample to test your urine. Other tests may be done, including:  · Blood tests.  · Sexually transmitted disease (STD) testing.  If you have had more than one UTI, a cystoscopy or imaging studies may be done to determine the cause of the infections.  How is this treated?  Treatment for this condition often includes a combination of two or more of the following:  · Antibiotic medicine.  · Other medicines to treat less common causes of UTI.  · Over-the-counter medicines to treat pain.  · Drinking enough water to stay hydrated.  Follow these instructions at home:  · Take over-the-counter and prescription medicines only as told by your health care provider.  · If you were prescribed an antibiotic, take it as told by your health care provider. Do not stop taking the antibiotic even if you start to feel better.  · Avoid alcohol, caffeine, tea, and carbonated beverages. They can irritate your bladder.  · Drink enough fluid to keep your urine clear or pale yellow.  · Keep all follow-up visits as told by your health care provider. This is important.  · Make sure to:  ¨ Empty your bladder often and completely. Do not hold urine for long periods of time.  ¨ Empty your bladder before and after sex.  ¨ Wipe from front to back after a bowel movement if you are female. Use each tissue one time when you wipe.  Contact a health care provider if:  · You have back pain.  · You have a fever.  · You feel nauseous or vomit.  · Your symptoms do not get better after 3 days.  · Your symptoms go away and then return.  Get help right away if:  · You have severe back pain or lower abdominal pain.  · You are vomiting and cannot keep down any medicines or water.  This information is not intended to replace advice given to you by your health care provider. Make sure you discuss any questions you have with your health care provider.  Document Released:  09/27/2006 Document Revised: 05/31/2017 Document Reviewed: 11/07/2016  [employer] Patient Education © 2017 [employer]    Kidney Stones  Kidney stones (urolithiasis) are solid, rock-like deposits that form inside of the organs that make urine (kidneys). A kidney stone may form in a kidney and move into the bladder, where it can cause intense pain and block the flow of urine. Kidney stones are created when high levels of certain minerals are found in the urine. They are usually passed through urination, but in some cases, medical treatment may be needed to remove them.  What are the causes?  Kidney stones may be caused by:  · A condition in which certain glands produce too much parathyroid hormone (primary hyperparathyroidism), which causes too much calcium buildup in the blood.  · Buildup of uric acid crystals in the bladder (hyperuricosuria). Uric acid is a chemical that the body produces when you eat certain foods. It usually exits the body in the urine.  · Narrowing (stricture) of one or both of the tubes that drain urine from the kidneys to the bladder (ureters).  · A kidney blockage that is present at birth (congenital obstruction).  · Past surgery on the kidney or the ureters, such as gastric bypass surgery.  What increases the risk?  The following factors make you more likely to develop kidney stones:  · Having had a kidney stone in the past.  · Having a family history of kidney stones.  · Not drinking enough water.  · Eating a diet that is high in protein, salt (sodium), or sugar.  · Being overweight or obese.  What are the signs or symptoms?  Symptoms of a kidney stone may include:  · Nausea.  · Vomiting.  · Blood in the urine (hematuria).  · Pain in the side of the abdomen, right below the ribs (flank pain). Pain usually spreads (radiates) to the groin.  · Needing to urinate frequently or urgently.  How is this diagnosed?  This condition may be diagnosed based on:  · Your medical history.  · A  physical exam.  · Blood tests.  · Urine tests.  · CT scan.  · Abdominal X-ray.  · A procedure to examine the inside of the bladder (cystoscopy).  How is this treated?  Treatment for kidney stones depends on the size, location, and makeup of the stones. Treatment may involve:  · Analyzing your urine before and after you pass the stone through urination.  · Being monitored at the hospital until you pass the stone through urination.  · Increasing your fluid intake and decreasing the amount of calcium and protein in your diet.  · A procedure to break up kidney stones in the bladder using:  ¨ A focused beam of light (laser therapy).  ¨ Shock waves (extracorporeal shock wave lithotripsy).  · Surgery to remove kidney stones. This may be needed if you have severe pain or have stones that block your urinary tract.  Follow these instructions at home:  Eating and drinking     · Drink enough fluid to keep your urine clear or pale yellow. This will help you to pass the kidney stone.  · If directed, change your diet. This may include:  ¨ Limiting how much sodium you eat.  ¨ Eating more fruits and vegetables.  ¨ Limiting how much meat, poultry, fish, and eggs you eat.  · Follow instructions from your health care provider about eating or drinking restrictions.  General instructions   · Collect urine samples as told by your health care provider. You may need to collect a urine sample:  ¨ 24 hours after you pass the stone.  ¨ 8-12 weeks after passing the kidney stone, and every 6-12 months after that.  · Strain your urine every time you urinate, for as long as directed. Use the strainer that your health care provider recommends.  · Do not throw out the kidney stone after passing it. Keep the stone so it can be tested by your health care provider. Testing the makeup of your kidney stone may help prevent you from getting kidney stones in the future.  · Take over-the-counter and prescription medicines only as told by your health care  provider.  · Keep all follow-up visits as told by your health care provider. This is important. You may need follow-up X-rays or ultrasounds to make sure that your stone has passed.  How is this prevented?  To prevent another kidney stone:  · Drink enough fluid to keep your urine clear or pale yellow. This is the best way to prevent kidney stones.  · Eat a healthy diet and follow recommendations from your health care provider about foods to avoid. You may be instructed to eat a low-protein diet. Recommendations vary depending on the type of kidney stone that you have.  · Maintain a healthy weight.  Contact a health care provider if:  · You have pain that gets worse or does not get better with medicine.  Get help right away if:  · You have a fever or chills.  · You develop severe pain.  · You develop new abdominal pain.  · You faint.  · You are unable to urinate.  This information is not intended to replace advice given to you by your health care provider. Make sure you discuss any questions you have with your health care provider.  Document Released: 12/18/2006 Document Revised: 07/07/2017 Document Reviewed: 06/02/2017  [employer] Patient Education © 2017 [employer]    -Patient referred to ED for further f/u and care. Pt refused EMS/ ambulance services and states tthat he is not going to an ED tonight. Education done on risks of not seeking further treatment. PT verbalized understanding.

## 2018-05-23 LAB
BACTERIA UR CULT: NORMAL
BACTERIA UR CULT: NORMAL

## 2018-07-09 ENCOUNTER — TELEPHONE (OUTPATIENT)
Dept: INTERNAL MEDICINE | Facility: CLINIC | Age: 53
End: 2018-07-09

## 2018-07-09 DIAGNOSIS — E03.9 ACQUIRED HYPOTHYROIDISM: Primary | ICD-10-CM

## 2018-07-09 NOTE — TELEPHONE ENCOUNTER
PATIENT WOULD LIKE TO HAVE HER BLOOD ORDER FAXED OVER TO A DR ROLF [facility] OFFICE SO THAT SHE CAN HAVE THEM DONE THERE, THE FAX NUMBER --[phone] AND THE PATIENT'S CALL BACK NUMBER -[phone] IF THERE IS ANY QUESTIONS OR CONCERNS

## 2018-07-17 ENCOUNTER — TELEPHONE (OUTPATIENT)
Dept: ENDOCRINOLOGY | Facility: CLINIC | Age: 53
End: 2018-07-17

## 2018-07-17 RX ORDER — LEVOTHYROXINE SODIUM 88 UG/1
88 TABLET ORAL DAILY
Qty: 90 TABLET | Refills: 1 | Status: SHIPPED | OUTPATIENT
Start: 2018-07-17 | End: 2019-01-15 | Stop reason: SDUPTHER

## 2018-11-16 ENCOUNTER — TELEPHONE (OUTPATIENT)
Dept: CARDIOLOGY | Facility: CLINIC | Age: 53
End: 2018-11-16

## 2019-01-15 ENCOUNTER — OFFICE VISIT (OUTPATIENT)
Dept: ENDOCRINOLOGY | Facility: CLINIC | Age: 54
End: 2019-01-15

## 2019-01-15 VITALS
HEIGHT: 69 IN | WEIGHT: 162.7 LBS | HEART RATE: 62 BPM | BODY MASS INDEX: 24.1 KG/M2 | DIASTOLIC BLOOD PRESSURE: 60 MMHG | SYSTOLIC BLOOD PRESSURE: 138 MMHG | OXYGEN SATURATION: 99 %

## 2019-01-15 DIAGNOSIS — E03.9 ACQUIRED HYPOTHYROIDISM: ICD-10-CM

## 2019-01-15 DIAGNOSIS — E78.2 MIXED HYPERLIPIDEMIA: Primary | ICD-10-CM

## 2019-01-15 DIAGNOSIS — K57.92 DIVERTICULITIS: ICD-10-CM

## 2019-01-15 DIAGNOSIS — I10 ESSENTIAL HYPERTENSION: ICD-10-CM

## 2019-01-15 DIAGNOSIS — E55.9 VITAMIN D DEFICIENCY: ICD-10-CM

## 2019-01-15 LAB
25(OH)D3 SERPL-MCNC: 21.1 NG/ML
ALBUMIN SERPL-MCNC: 4.83 G/DL (ref 3.2–4.8)
ALBUMIN/GLOB SERPL: 2.2 G/DL (ref 1.5–2.5)
ALP SERPL-CCNC: 71 U/L (ref 25–100)
ALT SERPL W P-5'-P-CCNC: 46 U/L (ref 7–40)
ANION GAP SERPL CALCULATED.3IONS-SCNC: 8 MMOL/L (ref 3–11)
ARTICHOKE IGE QN: 122 MG/DL (ref 0–130)
AST SERPL-CCNC: 32 U/L (ref 0–33)
BASOPHILS # BLD AUTO: 0.03 10*3/MM3 (ref 0–0.2)
BASOPHILS NFR BLD AUTO: 0.5 % (ref 0–1)
BILIRUB SERPL-MCNC: 0.7 MG/DL (ref 0.3–1.2)
BUN BLD-MCNC: 12 MG/DL (ref 9–23)
BUN/CREAT SERPL: 13.8 (ref 7–25)
CALCIUM SPEC-SCNC: 9.9 MG/DL (ref 8.7–10.4)
CHLORIDE SERPL-SCNC: 107 MMOL/L (ref 99–109)
CHOLEST SERPL-MCNC: 212 MG/DL (ref 0–200)
CO2 SERPL-SCNC: 28 MMOL/L (ref 20–31)
CREAT BLD-MCNC: 0.87 MG/DL (ref 0.6–1.3)
DEPRECATED RDW RBC AUTO: 43.6 FL (ref 37–54)
EOSINOPHIL # BLD AUTO: 0.04 10*3/MM3 (ref 0–0.3)
EOSINOPHIL NFR BLD AUTO: 0.7 % (ref 0–3)
ERYTHROCYTE [DISTWIDTH] IN BLOOD BY AUTOMATED COUNT: 12.9 % (ref 11.3–14.5)
GFR SERPL CREATININE-BSD FRML MDRD: 68 ML/MIN/1.73
GLOBULIN UR ELPH-MCNC: 2.2 GM/DL
GLUCOSE BLD-MCNC: 92 MG/DL (ref 70–100)
HCT VFR BLD AUTO: 42.9 % (ref 34.5–44)
HDLC SERPL-MCNC: 72 MG/DL (ref 40–60)
HGB BLD-MCNC: 13.8 G/DL (ref 11.5–15.5)
IMM GRANULOCYTES # BLD AUTO: 0.01 10*3/MM3 (ref 0–0.03)
IMM GRANULOCYTES NFR BLD AUTO: 0.2 % (ref 0–0.6)
LYMPHOCYTES # BLD AUTO: 1.72 10*3/MM3 (ref 0.6–4.8)
LYMPHOCYTES NFR BLD AUTO: 31.4 % (ref 24–44)
MCH RBC QN AUTO: 29.7 PG (ref 27–31)
MCHC RBC AUTO-ENTMCNC: 32.2 G/DL (ref 32–36)
MCV RBC AUTO: 92.5 FL (ref 80–99)
MONOCYTES # BLD AUTO: 0.32 10*3/MM3 (ref 0–1)
MONOCYTES NFR BLD AUTO: 5.9 % (ref 0–12)
NEUTROPHILS # BLD AUTO: 3.36 10*3/MM3 (ref 1.5–8.3)
NEUTROPHILS NFR BLD AUTO: 61.5 % (ref 41–71)
PLATELET # BLD AUTO: 210 10*3/MM3 (ref 150–450)
PMV BLD AUTO: 12 FL (ref 6–12)
POTASSIUM BLD-SCNC: 4.8 MMOL/L (ref 3.5–5.5)
PROT SERPL-MCNC: 7 G/DL (ref 5.7–8.2)
RBC # BLD AUTO: 4.64 10*6/MM3 (ref 3.89–5.14)
SODIUM BLD-SCNC: 143 MMOL/L (ref 132–146)
T4 FREE SERPL-MCNC: 1.51 NG/DL (ref 0.89–1.76)
TRIGL SERPL-MCNC: 95 MG/DL (ref 0–150)
TSH SERPL DL<=0.05 MIU/L-ACNC: 0.47 MIU/ML (ref 0.35–5.35)
VIT B12 BLD-MCNC: 1278 PG/ML (ref 211–911)
WBC NRBC COR # BLD: 5.47 10*3/MM3 (ref 3.5–10.8)

## 2019-01-15 PROCEDURE — 80061 LIPID PANEL: CPT | Performed by: INTERNAL MEDICINE

## 2019-01-15 PROCEDURE — 84443 ASSAY THYROID STIM HORMONE: CPT | Performed by: INTERNAL MEDICINE

## 2019-01-15 PROCEDURE — 85025 COMPLETE CBC W/AUTO DIFF WBC: CPT | Performed by: INTERNAL MEDICINE

## 2019-01-15 PROCEDURE — 80053 COMPREHEN METABOLIC PANEL: CPT | Performed by: INTERNAL MEDICINE

## 2019-01-15 PROCEDURE — 84439 ASSAY OF FREE THYROXINE: CPT | Performed by: INTERNAL MEDICINE

## 2019-01-15 PROCEDURE — 99213 OFFICE O/P EST LOW 20 MIN: CPT | Performed by: INTERNAL MEDICINE

## 2019-01-15 PROCEDURE — 82607 VITAMIN B-12: CPT | Performed by: INTERNAL MEDICINE

## 2019-01-15 PROCEDURE — 82306 VITAMIN D 25 HYDROXY: CPT | Performed by: INTERNAL MEDICINE

## 2019-01-15 RX ORDER — LEVOTHYROXINE SODIUM 88 UG/1
88 TABLET ORAL DAILY
Qty: 90 TABLET | Refills: 3 | Status: SHIPPED | OUTPATIENT
Start: 2019-01-15 | End: 2020-01-16 | Stop reason: SDUPTHER

## 2019-01-15 NOTE — PROGRESS NOTES
Dory Mccord 53 y.o.  CC:Follow-up and Hypothyroidism      [place]: Follow-up and Hypothyroidism    Is on synthroid 88 mcg daily   Energy is good   bp is good   Had diverticulitis in May   Also egd- they increased nexium (would have liked dexilant but it was too expensive, worked well)    Allergies   Allergen Reactions   • Oxycodone-Acetaminophen Itching       Current Outpatient Medications:   •  Cholecalciferol (VITAMIN D) 1000 UNITS tablet, Take  by mouth., Disp: , Rfl:   •  Cyanocobalamin ER (RA VITAMIN B-12 TR) 1000 MCG tablet controlled-release, Take  by mouth., Disp: , Rfl:   •  FIBER COMPLETE PO, Take  by mouth 2 (Two) Times a Day., Disp: , Rfl:   •  fluticasone (FLONASE) 50 MCG/ACT nasal spray, into each nostril daily., Disp: , Rfl:   •  levothyroxine (SYNTHROID, LEVOTHROID) 88 MCG tablet, Take 1 tablet by mouth Daily., Disp: 90 tablet, Rfl: 1  •  LORazepam (ATIVAN) 0.5 MG tablet, Take  by mouth., Disp: , Rfl:   •  esomeprazole (NEXIUM) 20 MG capsule, Take 40 mg by mouth., Disp: , Rfl:   Patient Active Problem List    Diagnosis   • Acute stress disorder [F43.0]   • Atrial premature depolarization [I49.1]   • Chest pain [R07.9]   • Hiatal hernia [K44.9]   • Hyperlipidemia [E78.5]   • Hypertension [I10]   • Hypothyroidism [E03.9]   • Irritable bowel syndrome [K58.9]   • Uterine leiomyoma [D25.9]   • Mass of breast [N63.0]   • Calculus of kidney [N20.0]   • Obstructive sleep apnea syndrome [G47.33]   • Cyst of ovary [N83.209]   • Palpitations [R00.2]   • Seasonal allergic rhinitis [J30.2]   • Vitamin D deficiency [E55.9]     Review of Systems   Constitutional: Negative for activity change, appetite change, chills, diaphoresis, fatigue, fever and unexpected weight change.   HENT: Negative for congestion, dental problem, drooling, ear discharge, ear pain, facial swelling, hearing loss, mouth sores, nosebleeds, postnasal drip, rhinorrhea, sinus pressure, sneezing, sore throat, tinnitus, trouble swallowing and voice  change.    Eyes: Negative for photophobia, pain, discharge, redness, itching and visual disturbance.   Respiratory: Negative for apnea, cough, choking, chest tightness, shortness of breath, wheezing and stridor.    Cardiovascular: Negative for chest pain, palpitations and leg swelling.   Gastrointestinal: Negative for abdominal distention, abdominal pain, anal bleeding, blood in stool, constipation, diarrhea, nausea, rectal pain and vomiting.   Endocrine: Negative for cold intolerance, heat intolerance, polydipsia, polyphagia and polyuria.   Genitourinary: Negative for decreased urine volume, difficulty urinating, dysuria, enuresis, flank pain, frequency, genital sores, hematuria and urgency.   Musculoskeletal: Negative for arthralgias, back pain, gait problem, joint swelling, myalgias, neck pain and neck stiffness.   Skin: Negative for color change, pallor, rash and wound.   Allergic/Immunologic: Negative for environmental allergies, food allergies and immunocompromised state.   Neurological: Negative for dizziness, tremors, seizures, syncope, facial asymmetry, speech difficulty, weakness, light-headedness, numbness and headaches.   Hematological: Negative for adenopathy. Does not bruise/bleed easily.   Psychiatric/Behavioral: Negative for agitation, behavioral problems, confusion, decreased concentration, dysphoric mood, hallucinations, self-injury, sleep disturbance and suicidal ideas. The patient is not nervous/anxious and is not hyperactive.      Social History     Socioeconomic History   • Marital status:      Spouse name: Not on file   • Number of children: Not on file   • Years of education: Not on file   • Highest education level: Not on file   Social Needs   • Financial resource strain: Not on file   • Food insecurity - worry: Not on file   • Food insecurity - inability: Not on file   • Transportation needs - medical: Not on file   • Transportation needs - non-medical: Not on file   Occupational  "History   • Not on file   Tobacco Use   • Smoking status: Former Smoker   • Smokeless tobacco: Never Used   Substance and Sexual Activity   • Alcohol use: Yes     Comment: 4 drinks per week   • Drug use: No   • Sexual activity: Defer   Other Topics Concern   • Not on file   Social History Narrative   • Not on file     Family History   Problem Relation Age of Onset   • Diabetes Mother    • Heart disease Mother    • Melanoma Mother    • Cancer Father    • Cancer Maternal Grandmother    • Arthritis Other    • Breast cancer Other    • Hyperlipidemia Other    • Hypertension Other    • Lung cancer Other    • Stroke Other      /60   Pulse 62   Ht 175.3 cm (69\")   Wt 73.8 kg (162 lb 11.2 oz)   SpO2 99%   Breastfeeding? No   BMI 24.03 kg/m²   Physical Exam   Constitutional: She is oriented to person, place, and time. She appears well-developed and well-nourished.   HENT:   Head: Normocephalic and atraumatic.   Nose: Nose normal.   Mouth/Throat: Oropharynx is clear and moist.   Eyes: Conjunctivae, EOM and lids are normal. Pupils are equal, round, and reactive to light.   Neck: Trachea normal and normal range of motion. Neck supple. Carotid bruit is not present. No tracheal deviation present. No thyroid mass and no thyromegaly present.   Cardiovascular: Normal rate, regular rhythm, normal heart sounds and intact distal pulses. Exam reveals no gallop and no friction rub.   No murmur heard.  Pulmonary/Chest: Effort normal and breath sounds normal. No respiratory distress. She has no wheezes.   Musculoskeletal: Normal range of motion. She exhibits no edema or deformity.   Lymphadenopathy:     She has no cervical adenopathy.   Neurological: She is alert and oriented to person, place, and time. She has normal reflexes. She displays normal reflexes. No cranial nerve deficit.   Skin: Skin is warm and dry. No rash noted. No cyanosis or erythema. Nails show no clubbing.   Psychiatric: She has a normal mood and affect. Her " speech is normal and behavior is normal. Judgment and thought content normal. Cognition and memory are normal.   Nursing note and vitals reviewed.    Results for orders placed or performed in visit on 05/18/18   Urine Culture - Urine, Urine, Clean Catch   Result Value Ref Range    Urine Culture Final report     Result 1 Comment    POC Influenza A / B   Result Value Ref Range    Rapid Influenza A Ag negaitve     Rapid Influenza B Ag negative     Internal Control Passed Passed    Lot Number [id number]     Expiration Date 11/20    POC Urinalysis Dipstick, Automated   Result Value Ref Range    Color Yellow Yellow, Straw, Dark Yellow, Melinda    Clarity, UA Clear Clear    Glucose, UA Negative Negative, 1000 mg/dL (3+) mg/dL    Bilirubin Negative Negative    Ketones, UA Negative Negative    Specific Gravity  1.015 1.005 - 1.030    Blood, UA Small (A) Negative    pH, Urine 0.2 (A) 5.0 - 8.0    Protein, POC Negative Negative mg/dL    Urobilinogen, UA Normal Normal    Leukocytes Trace (A) Negative    Nitrite, UA Negative Negative     Problem List Items Addressed This Visit        Cardiovascular and Mediastinum    Hypertension     bp is good- continue medications and monitoring          Relevant Orders    CBC & Differential    CBC Auto Differential    Hyperlipidemia - Primary     Update flp          Relevant Orders    Comprehensive Metabolic Panel    Lipid Panel       Digestive    Vitamin D deficiency     Update levels  Check b12          Relevant Orders    Vitamin B12    Vitamin D 25 Hydroxy       Endocrine    Hypothyroidism     Check tfts          Relevant Medications    levothyroxine (SYNTHROID, LEVOTHROID) 88 MCG tablet    Other Relevant Orders    TSH    T4, Free       Other    Diverticulitis     Now eating more fiber- recent egd and colonoscopy              Return in about 1 year (around 1/15/2020) for Recheck 30 min .    Peyton Jones MA  Signed Berta Loja MD

## 2019-01-24 ENCOUNTER — TELEPHONE (OUTPATIENT)
Dept: INTERNAL MEDICINE | Facility: CLINIC | Age: 54
End: 2019-01-24

## 2019-01-24 NOTE — TELEPHONE ENCOUNTER
Pt had numerous questions about lab results  All questions were answered and pt voiced understanding

## 2019-11-20 ENCOUNTER — TELEPHONE (OUTPATIENT)
Dept: INTERNAL MEDICINE | Facility: CLINIC | Age: 54
End: 2019-11-20

## 2019-11-20 NOTE — TELEPHONE ENCOUNTER
PLEASE CALL PT SHE HAD HER LABS FAXED TO [facility] AND SHE WANTED TO KNOW WHAT DR VERNON THINKS    PTS NUMBER [phone]

## 2019-11-21 NOTE — TELEPHONE ENCOUNTER
PT notified    Patient presenting c/o 1 month of room spinning sensation occuring intermittently also associated with fullness sensation of ears, tinnitus, and preceding viral type illness.  Seen at urgent care and got antibiotics/ear drops on onset of symptoms without improvement.  Has follow up appt with ENT later today.    On exam patient well appearing, vital signs within normal limits, bilateral ears with wax in canals TM grossly normal, RRR S1/S2, lungs clear to ascultation bilaterally, abdomen soft, non tender, non distended, normal neurologic exam.    History seems most consistent with menieres/labrynthitis - given meclizine in Emergency Department with symptom improvement, unremarkable labs, will DC so patient can keep ENT appointment for specialist evaluation.

## 2020-01-16 ENCOUNTER — OFFICE VISIT (OUTPATIENT)
Dept: ENDOCRINOLOGY | Facility: CLINIC | Age: 55
End: 2020-01-16

## 2020-01-16 VITALS
HEART RATE: 78 BPM | SYSTOLIC BLOOD PRESSURE: 112 MMHG | HEIGHT: 69 IN | WEIGHT: 157.4 LBS | OXYGEN SATURATION: 99 % | BODY MASS INDEX: 23.31 KG/M2 | DIASTOLIC BLOOD PRESSURE: 60 MMHG

## 2020-01-16 DIAGNOSIS — J32.0 CHRONIC MAXILLARY SINUSITIS: ICD-10-CM

## 2020-01-16 DIAGNOSIS — I10 ESSENTIAL HYPERTENSION: ICD-10-CM

## 2020-01-16 DIAGNOSIS — E03.9 ACQUIRED HYPOTHYROIDISM: Primary | ICD-10-CM

## 2020-01-16 PROCEDURE — 85025 COMPLETE CBC W/AUTO DIFF WBC: CPT | Performed by: INTERNAL MEDICINE

## 2020-01-16 PROCEDURE — 82306 VITAMIN D 25 HYDROXY: CPT | Performed by: INTERNAL MEDICINE

## 2020-01-16 PROCEDURE — 84439 ASSAY OF FREE THYROXINE: CPT | Performed by: INTERNAL MEDICINE

## 2020-01-16 PROCEDURE — 84443 ASSAY THYROID STIM HORMONE: CPT | Performed by: INTERNAL MEDICINE

## 2020-01-16 PROCEDURE — 99214 OFFICE O/P EST MOD 30 MIN: CPT | Performed by: INTERNAL MEDICINE

## 2020-01-16 PROCEDURE — 80061 LIPID PANEL: CPT | Performed by: INTERNAL MEDICINE

## 2020-01-16 PROCEDURE — 80053 COMPREHEN METABOLIC PANEL: CPT | Performed by: INTERNAL MEDICINE

## 2020-01-16 RX ORDER — AZELASTINE HYDROCHLORIDE, FLUTICASONE PROPIONATE 137; 50 UG/1; UG/1
1 SPRAY, METERED NASAL 2 TIMES DAILY
Qty: 23 G | Refills: 5 | Status: SHIPPED | OUTPATIENT
Start: 2020-01-16 | End: 2021-07-27 | Stop reason: ALTCHOICE

## 2020-01-16 RX ORDER — LEVOTHYROXINE SODIUM 88 UG/1
88 TABLET ORAL DAILY
Qty: 90 TABLET | Refills: 3 | Status: SHIPPED | OUTPATIENT
Start: 2020-01-16 | End: 2020-01-18

## 2020-01-16 NOTE — PROGRESS NOTES
Dory Mccord 54 y.o.  CC:Yearly Follow Up and Hypothyroidism      [place]: Yearly Follow Up and Hypothyroidism    Has had uri , now sinus infection   Is on synthroid 88 mcg daily   bp is good  Energy is good overall  Discussed options for management of sinuses and nasal spray provided   If no benefit will refer to ENT     Allergies   Allergen Reactions   • Flagyl [Metronidazole] Itching   • Oxycodone-Acetaminophen Itching       Current Outpatient Medications:   •  Cholecalciferol (VITAMIN D) 1000 UNITS tablet, Take  by mouth., Disp: , Rfl:   •  Cyanocobalamin ER (RA VITAMIN B-12 TR) 1000 MCG tablet controlled-release, Take  by mouth., Disp: , Rfl:   •  esomeprazole (NEXIUM) 20 MG capsule, Take 40 mg by mouth., Disp: , Rfl:   •  FIBER COMPLETE PO, Take  by mouth 2 (Two) Times a Day., Disp: , Rfl:   •  fluticasone (FLONASE) 50 MCG/ACT nasal spray, into each nostril daily., Disp: , Rfl:   •  levothyroxine (SYNTHROID, LEVOTHROID) 88 MCG tablet, Take 1 tablet by mouth Daily., Disp: 90 tablet, Rfl: 3  •  LORazepam (ATIVAN) 0.5 MG tablet, Take  by mouth., Disp: , Rfl:   Patient Active Problem List    Diagnosis   • Diverticulitis [K57.92]   • Acute stress disorder [F43.0]   • Atrial premature depolarization [I49.1]   • Chest pain [R07.9]   • Hiatal hernia [K44.9]   • Hyperlipidemia [E78.5]   • Hypertension [I10]   • Hypothyroidism [E03.9]   • Irritable bowel syndrome [K58.9]   • Uterine leiomyoma [D25.9]   • Mass of breast [N63.0]   • Calculus of kidney [N20.0]   • Obstructive sleep apnea syndrome [G47.33]   • Cyst of ovary [N83.209]   • Palpitations [R00.2]   • Seasonal allergic rhinitis [J30.2]   • Vitamin D deficiency [E55.9]     Review of Systems   Constitutional: Negative for activity change, appetite change, chills, diaphoresis, fatigue, fever and unexpected weight change.   HENT: Positive for congestion, postnasal drip and sinus pressure. Negative for dental problem, drooling, ear discharge, ear pain, facial swelling,  hearing loss, mouth sores, nosebleeds, rhinorrhea, sneezing, sore throat, tinnitus, trouble swallowing and voice change.    Eyes: Negative for photophobia, pain, discharge, redness, itching and visual disturbance.   Respiratory: Negative for apnea, cough, choking, chest tightness, shortness of breath, wheezing and stridor.    Cardiovascular: Negative for chest pain, palpitations and leg swelling.   Gastrointestinal: Negative for abdominal distention, abdominal pain, anal bleeding, blood in stool, constipation, diarrhea, nausea, rectal pain and vomiting.   Endocrine: Negative for cold intolerance, heat intolerance, polydipsia, polyphagia and polyuria.   Genitourinary: Negative for decreased urine volume, difficulty urinating, dysuria, enuresis, flank pain, frequency, genital sores, hematuria and urgency.   Musculoskeletal: Negative for arthralgias, back pain, gait problem, joint swelling, myalgias, neck pain and neck stiffness.   Skin: Negative for color change, pallor, rash and wound.   Allergic/Immunologic: Negative for environmental allergies, food allergies and immunocompromised state.   Neurological: Negative for dizziness, tremors, seizures, syncope, facial asymmetry, speech difficulty, weakness, light-headedness, numbness and headaches.   Hematological: Negative for adenopathy. Does not bruise/bleed easily.   Psychiatric/Behavioral: Negative for agitation, behavioral problems, confusion, decreased concentration, dysphoric mood, hallucinations, self-injury, sleep disturbance and suicidal ideas. The patient is not nervous/anxious and is not hyperactive.      Social History     Socioeconomic History   • Marital status:      Spouse name: Not on file   • Number of children: Not on file   • Years of education: Not on file   • Highest education level: Not on file   Tobacco Use   • Smoking status: Former Smoker   • Smokeless tobacco: Never Used   Substance and Sexual Activity   • Alcohol use: Yes     Comment: 4  "drinks per week   • Drug use: No   • Sexual activity: Defer     Family History   Problem Relation Age of Onset   • Diabetes Mother    • Heart disease Mother    • Melanoma Mother    • Cancer Father    • Cancer Maternal Grandmother    • Arthritis Other    • Breast cancer Other    • Hyperlipidemia Other    • Hypertension Other    • Lung cancer Other    • Stroke Other      /60   Pulse 78   Ht 175.3 cm (69\")   Wt 71.4 kg (157 lb 6.4 oz)   SpO2 99%   BMI 23.24 kg/m²   Physical Exam   Constitutional: She is oriented to person, place, and time. She appears well-developed and well-nourished.   HENT:   Head: Normocephalic and atraumatic.   Nose: Nose normal.   Mouth/Throat: Oropharynx is clear and moist.   Eyes: Pupils are equal, round, and reactive to light. Conjunctivae, EOM and lids are normal.   Neck: Trachea normal and normal range of motion. Neck supple. Carotid bruit is not present. No tracheal deviation present. No thyroid mass and no thyromegaly present.   Cardiovascular: Normal rate, regular rhythm, normal heart sounds and intact distal pulses. Exam reveals no gallop and no friction rub.   No murmur heard.  Pulmonary/Chest: Effort normal and breath sounds normal. No respiratory distress. She has no wheezes.   Musculoskeletal: Normal range of motion. She exhibits no edema or deformity.   Lymphadenopathy:     She has no cervical adenopathy.   Neurological: She is alert and oriented to person, place, and time. She has normal reflexes. She displays normal reflexes. No cranial nerve deficit.   Skin: Skin is warm and dry. No rash noted. No cyanosis or erythema. Nails show no clubbing.   Psychiatric: She has a normal mood and affect. Her speech is normal and behavior is normal. Judgment and thought content normal. Cognition and memory are normal.   Nursing note and vitals reviewed.    Results for orders placed or performed in visit on 01/15/19   Comprehensive Metabolic Panel   Result Value Ref Range    Glucose " 92 70 - 100 mg/dL    BUN 12 9 - 23 mg/dL    Creatinine 0.87 0.60 - 1.30 mg/dL    Sodium 143 132 - 146 mmol/L    Potassium 4.8 3.5 - 5.5 mmol/L    Chloride 107 99 - 109 mmol/L    CO2 28.0 20.0 - 31.0 mmol/L    Calcium 9.9 8.7 - 10.4 mg/dL    Total Protein 7.0 5.7 - 8.2 g/dL    Albumin 4.83 (H) 3.20 - 4.80 g/dL    ALT (SGPT) 46 (H) 7 - 40 U/L    AST (SGOT) 32 0 - 33 U/L    Alkaline Phosphatase 71 25 - 100 U/L    Total Bilirubin 0.7 0.3 - 1.2 mg/dL    eGFR Non African Amer 68 >60 mL/min/1.73    Globulin 2.2 gm/dL    A/G Ratio 2.2 1.5 - 2.5 g/dL    BUN/Creatinine Ratio 13.8 7.0 - 25.0    Anion Gap 8.0 3.0 - 11.0 mmol/L   Lipid Panel   Result Value Ref Range    Total Cholesterol 212 (H) 0 - 200 mg/dL    Triglycerides 95 0 - 150 mg/dL    HDL Cholesterol 72 (H) 40 - 60 mg/dL    LDL Cholesterol  122 0 - 130 mg/dL   TSH   Result Value Ref Range    TSH 0.466 0.350 - 5.350 mIU/mL   T4, Free   Result Value Ref Range    Free T4 1.51 0.89 - 1.76 ng/dL   Vitamin B12   Result Value Ref Range    Vitamin B-12 1,278 (H) 211 - 911 pg/mL   Vitamin D 25 Hydroxy   Result Value Ref Range    25 Hydroxy, Vitamin D 21.1 ng/ml   CBC Auto Differential   Result Value Ref Range    WBC 5.47 3.50 - 10.80 10*3/mm3    RBC 4.64 3.89 - 5.14 10*6/mm3    Hemoglobin 13.8 11.5 - 15.5 g/dL    Hematocrit 42.9 34.5 - 44.0 %    MCV 92.5 80.0 - 99.0 fL    MCH 29.7 27.0 - 31.0 pg    MCHC 32.2 32.0 - 36.0 g/dL    RDW 12.9 11.3 - 14.5 %    RDW-SD 43.6 37.0 - 54.0 fl    MPV 12.0 6.0 - 12.0 fL    Platelets 210 150 - 450 10*3/mm3    Neutrophil % 61.5 41.0 - 71.0 %    Lymphocyte % 31.4 24.0 - 44.0 %    Monocyte % 5.9 0.0 - 12.0 %    Eosinophil % 0.7 0.0 - 3.0 %    Basophil % 0.5 0.0 - 1.0 %    Immature Grans % 0.2 0.0 - 0.6 %    Neutrophils, Absolute 3.36 1.50 - 8.30 10*3/mm3    Lymphocytes, Absolute 1.72 0.60 - 4.80 10*3/mm3    Monocytes, Absolute 0.32 0.00 - 1.00 10*3/mm3    Eosinophils, Absolute 0.04 0.00 - 0.30 10*3/mm3    Basophils, Absolute 0.03 0.00 - 0.20  10*3/mm3    Immature Grans, Absolute 0.01 0.00 - 0.03 10*3/mm3     Problem List Items Addressed This Visit        Cardiovascular and Mediastinum    Hypertension     bp is good   Continue monitoring and medication          Relevant Orders    CBC Auto Differential    Vitamin D 25 Hydroxy       Respiratory    Chronic maxillary sinusitis     Nasalcort spray provided   She will let me know if she needs referral/ no benefit with spray             Endocrine    Hypothyroidism - Primary     Check tfts today   Feels well overall          Relevant Medications    levothyroxine (SYNTHROID, LEVOTHROID) 88 MCG tablet    Other Relevant Orders    Comprehensive Metabolic Panel    Lipid Panel    T4, Free    TSH        Return in about 1 year (around 1/16/2021) for Recheck 30 min .    Peyton Jones MA  Signed Berta Loja MD

## 2020-01-17 LAB
25(OH)D3 SERPL-MCNC: 29.9 NG/ML (ref 30–100)
ALBUMIN SERPL-MCNC: 4.6 G/DL (ref 3.5–5.2)
ALBUMIN/GLOB SERPL: 1.5 G/DL
ALP SERPL-CCNC: 61 U/L (ref 39–117)
ALT SERPL W P-5'-P-CCNC: 22 U/L (ref 1–33)
ANION GAP SERPL CALCULATED.3IONS-SCNC: 12 MMOL/L (ref 5–15)
AST SERPL-CCNC: 18 U/L (ref 1–32)
BASOPHILS # BLD AUTO: 0.03 10*3/MM3 (ref 0–0.2)
BASOPHILS NFR BLD AUTO: 0.5 % (ref 0–1.5)
BILIRUB SERPL-MCNC: 0.5 MG/DL (ref 0.2–1.2)
BUN BLD-MCNC: 12 MG/DL (ref 6–20)
BUN/CREAT SERPL: 14.6 (ref 7–25)
CALCIUM SPEC-SCNC: 10.2 MG/DL (ref 8.6–10.5)
CHLORIDE SERPL-SCNC: 106 MMOL/L (ref 98–107)
CHOLEST SERPL-MCNC: 196 MG/DL (ref 0–200)
CO2 SERPL-SCNC: 28 MMOL/L (ref 22–29)
CREAT BLD-MCNC: 0.82 MG/DL (ref 0.57–1)
DEPRECATED RDW RBC AUTO: 41.4 FL (ref 37–54)
EOSINOPHIL # BLD AUTO: 0.03 10*3/MM3 (ref 0–0.4)
EOSINOPHIL NFR BLD AUTO: 0.5 % (ref 0.3–6.2)
ERYTHROCYTE [DISTWIDTH] IN BLOOD BY AUTOMATED COUNT: 12.7 % (ref 12.3–15.4)
GFR SERPL CREATININE-BSD FRML MDRD: 72 ML/MIN/1.73
GLOBULIN UR ELPH-MCNC: 3.1 GM/DL
GLUCOSE BLD-MCNC: 85 MG/DL (ref 65–99)
HCT VFR BLD AUTO: 40.3 % (ref 34–46.6)
HDLC SERPL-MCNC: 74 MG/DL (ref 40–60)
HGB BLD-MCNC: 13.4 G/DL (ref 12–15.9)
IMM GRANULOCYTES # BLD AUTO: 0.02 10*3/MM3 (ref 0–0.05)
IMM GRANULOCYTES NFR BLD AUTO: 0.3 % (ref 0–0.5)
LDLC SERPL CALC-MCNC: 108 MG/DL (ref 0–100)
LDLC/HDLC SERPL: 1.46 {RATIO}
LYMPHOCYTES # BLD AUTO: 1.69 10*3/MM3 (ref 0.7–3.1)
LYMPHOCYTES NFR BLD AUTO: 27.9 % (ref 19.6–45.3)
MCH RBC QN AUTO: 29.9 PG (ref 26.6–33)
MCHC RBC AUTO-ENTMCNC: 33.3 G/DL (ref 31.5–35.7)
MCV RBC AUTO: 90 FL (ref 79–97)
MONOCYTES # BLD AUTO: 0.45 10*3/MM3 (ref 0.1–0.9)
MONOCYTES NFR BLD AUTO: 7.4 % (ref 5–12)
NEUTROPHILS # BLD AUTO: 3.83 10*3/MM3 (ref 1.7–7)
NEUTROPHILS NFR BLD AUTO: 63.4 % (ref 42.7–76)
NRBC BLD AUTO-RTO: 0 /100 WBC (ref 0–0.2)
PLATELET # BLD AUTO: 204 10*3/MM3 (ref 140–450)
PMV BLD AUTO: 11.5 FL (ref 6–12)
POTASSIUM BLD-SCNC: 4.8 MMOL/L (ref 3.5–5.2)
PROT SERPL-MCNC: 7.7 G/DL (ref 6–8.5)
RBC # BLD AUTO: 4.48 10*6/MM3 (ref 3.77–5.28)
SODIUM BLD-SCNC: 146 MMOL/L (ref 136–145)
T4 FREE SERPL-MCNC: 1.73 NG/DL (ref 0.93–1.7)
TRIGL SERPL-MCNC: 71 MG/DL (ref 0–150)
TSH SERPL DL<=0.05 MIU/L-ACNC: 0.16 UIU/ML (ref 0.27–4.2)
VLDLC SERPL-MCNC: 14.2 MG/DL (ref 5–40)
WBC NRBC COR # BLD: 6.05 10*3/MM3 (ref 3.4–10.8)

## 2020-01-18 ENCOUNTER — TELEPHONE (OUTPATIENT)
Dept: ENDOCRINOLOGY | Facility: CLINIC | Age: 55
End: 2020-01-18

## 2020-01-18 RX ORDER — LEVOTHYROXINE SODIUM 0.07 MG/1
75 TABLET ORAL DAILY
Qty: 90 TABLET | Refills: 1 | Status: SHIPPED | OUTPATIENT
Start: 2020-01-18 | End: 2020-07-09

## 2020-04-09 ENCOUNTER — TELEPHONE (OUTPATIENT)
Dept: ENDOCRINOLOGY | Facility: CLINIC | Age: 55
End: 2020-04-09

## 2020-04-09 NOTE — TELEPHONE ENCOUNTER
Pt states she has severe fatigue and had labs done recently and BP has been up and down and she saw PCP and was started on HCT last week and she wanted to discuss all of this with Dr Loja.  Video visit was scheduled for Monday, April 13 at 10:30

## 2020-04-09 NOTE — TELEPHONE ENCOUNTER
Pt called to say that she is having her labs faxed to us.  (I told her when we get them we would have to set up an appt to discuss them with her)she says she is not feeling well bp is all over the place and she is tired.    [phone] number [phone]

## 2020-04-13 ENCOUNTER — TELEMEDICINE (OUTPATIENT)
Dept: ENDOCRINOLOGY | Facility: CLINIC | Age: 55
End: 2020-04-13

## 2020-04-13 VITALS — DIASTOLIC BLOOD PRESSURE: 104 MMHG | SYSTOLIC BLOOD PRESSURE: 118 MMHG | HEART RATE: 118 BPM

## 2020-04-13 DIAGNOSIS — E78.2 MIXED HYPERLIPIDEMIA: Primary | ICD-10-CM

## 2020-04-13 DIAGNOSIS — E03.9 ACQUIRED HYPOTHYROIDISM: ICD-10-CM

## 2020-04-13 DIAGNOSIS — I10 ESSENTIAL HYPERTENSION: ICD-10-CM

## 2020-04-13 DIAGNOSIS — R07.2 PRECORDIAL PAIN: ICD-10-CM

## 2020-04-13 PROCEDURE — 99214 OFFICE O/P EST MOD 30 MIN: CPT | Performed by: INTERNAL MEDICINE

## 2020-04-13 RX ORDER — HYDROCHLOROTHIAZIDE 25 MG/1
TABLET ORAL
COMMUNITY
Start: 2020-04-08

## 2020-04-13 NOTE — ASSESSMENT & PLAN NOTE
Recent normal tfts reviewed with her   tsh in good range- continue current level of supplementation

## 2020-04-13 NOTE — PROGRESS NOTES
Dory Mccord 54 y.o.  CC:  Follow-up; Hypothyroidism; Hyperlipidemia; and Chest Pain      [place]: Follow-up; Hypothyroidism; Hyperlipidemia; and Chest Pain  This was an audio and video enabled telemedicine encounter conducted with patient consent and in compliance with patient concerns about safety during pandemic    Is on synthroid 75 mcg daily   Is on vitamin D supplement  Is on low fat diet   Is having major issues with bp and heart rate   bp has been high, labile overall  Has been checking for past 3 days  Noon yesterday 157/104 with pulse 104  She has had  This am 118/102 with pulse 118   Heart rate has been staying in 110-120  Lowest 78-80  She is on hctz now (Dr Villagomez added)  He felt like bp was too high (4 marginal and 2 normal)  Discussed hctz action usually 4 weeks to full effect  Years ago she was on lisinopril   Allergy testing done and she was switched off of medication  Felt terrible on metoprolol (severe fatigue)  Discussed adjusting medication   Bottom number usually high   Had bout of chest pressure last Tuesday, upper back pain between shoulder blades, left neck and radiating left arm   Was not seen for this  Pain remitted and has not recurred   Feels like it is tension- using heating pad  Sleeping ok at night   That day had nausea and sweats, diarrhea  She has been less active - legs feel weak and arms fatigued  We did change thyroid dose in January   Has not had ekg    Allergies   Allergen Reactions   • Flagyl [Metronidazole] Itching   • Oxycodone-Acetaminophen Itching       Current Outpatient Medications:   •  Azelastine-Fluticasone 137-50 MCG/ACT suspension, 1 spray into the nostril(s) as directed by provider 2 (Two) Times a Day., Disp: 23 g, Rfl: 5  •  Cholecalciferol (VITAMIN D) 1000 UNITS tablet, Take  by mouth., Disp: , Rfl:   •  Cyanocobalamin ER (RA VITAMIN B-12 TR) 1000 MCG tablet controlled-release, Take  by mouth., Disp: , Rfl:   •  esomeprazole (NEXIUM) 20 MG capsule, Take 40 mg by  mouth., Disp: , Rfl:   •  FIBER COMPLETE PO, Take  by mouth 2 (Two) Times a Day., Disp: , Rfl:   •  fluticasone (FLONASE) 50 MCG/ACT nasal spray, into each nostril daily., Disp: , Rfl:   •  levothyroxine (SYNTHROID) 75 MCG tablet, Take 1 tablet by mouth Daily., Disp: 90 tablet, Rfl: 1  •  LORazepam (ATIVAN) 0.5 MG tablet, Take  by mouth., Disp: , Rfl:   Patient Active Problem List    Diagnosis   • Chronic maxillary sinusitis [J32.0]   • Diverticulitis [K57.92]   • Acute stress disorder [F43.0]   • Atrial premature depolarization [I49.1]   • Chest pain [R07.9]   • Hiatal hernia [K44.9]   • Hyperlipidemia [E78.5]   • Hypertension [I10]   • Hypothyroidism [E03.9]   • Irritable bowel syndrome [K58.9]   • Uterine leiomyoma [D25.9]   • Mass of breast [N63.0]   • Calculus of kidney [N20.0]   • Obstructive sleep apnea syndrome [G47.33]   • Cyst of ovary [N83.209]   • Palpitations [R00.2]   • Seasonal allergic rhinitis [J30.2]   • Vitamin D deficiency [E55.9]     Review of Systems   Constitutional: Positive for activity change, appetite change and fatigue.   Respiratory: Positive for chest tightness and shortness of breath.    Cardiovascular: Positive for chest pain and palpitations.   Musculoskeletal: Positive for arthralgias and neck pain.   Neurological: Positive for dizziness and weakness.   Psychiatric/Behavioral: Positive for dysphoric mood. The patient is nervous/anxious.      Social History     Socioeconomic History   • Marital status:      Spouse name: Not on file   • Number of children: Not on file   • Years of education: Not on file   • Highest education level: Not on file   Tobacco Use   • Smoking status: Former Smoker     Packs/day: 0.00     Years: 0.00     Pack years: 0.00     Types: Cigarettes   • Smokeless tobacco: Never Used   Substance and Sexual Activity   • Alcohol use: Yes     Alcohol/week: 3.0 standard drinks     Types: 3 drink(s) per week     Comment: 4 drinks per week   • Drug use: No   •  Sexual activity: Yes     Partners: Male     Birth control/protection: Post-menopausal     Family History   Problem Relation Age of Onset   • Diabetes Mother    • Heart disease Mother    • Melanoma Mother    • Cancer Father         Lung cancer   • Cancer Maternal Grandmother         Breast cancer   • Arthritis Other    • Breast cancer Other    • Hyperlipidemia Other    • Hypertension Other    • Lung cancer Other    • Stroke Other      There were no vitals taken for this visit.  Physical Exam   Constitutional: She is oriented to person, place, and time. She appears well-developed and well-nourished.   HENT:   Head: Normocephalic and atraumatic.   Pulmonary/Chest: Effort normal.   Neurological: She is alert and oriented to person, place, and time.   Skin: Skin is warm and dry.     Results for orders placed or performed in visit on 01/16/20   Comprehensive Metabolic Panel   Result Value Ref Range    Glucose 85 65 - 99 mg/dL    BUN 12 6 - 20 mg/dL    Creatinine 0.82 0.57 - 1.00 mg/dL    Sodium 146 (H) 136 - 145 mmol/L    Potassium 4.8 3.5 - 5.2 mmol/L    Chloride 106 98 - 107 mmol/L    CO2 28.0 22.0 - 29.0 mmol/L    Calcium 10.2 8.6 - 10.5 mg/dL    Total Protein 7.7 6.0 - 8.5 g/dL    Albumin 4.60 3.50 - 5.20 g/dL    ALT (SGPT) 22 1 - 33 U/L    AST (SGOT) 18 1 - 32 U/L    Alkaline Phosphatase 61 39 - 117 U/L    Total Bilirubin 0.5 0.2 - 1.2 mg/dL    eGFR Non African Amer 72 >60 mL/min/1.73    Globulin 3.1 gm/dL    A/G Ratio 1.5 g/dL    BUN/Creatinine Ratio 14.6 7.0 - 25.0    Anion Gap 12.0 5.0 - 15.0 mmol/L   CBC Auto Differential   Result Value Ref Range    WBC 6.05 3.40 - 10.80 10*3/mm3    RBC 4.48 3.77 - 5.28 10*6/mm3    Hemoglobin 13.4 12.0 - 15.9 g/dL    Hematocrit 40.3 34.0 - 46.6 %    MCV 90.0 79.0 - 97.0 fL    MCH 29.9 26.6 - 33.0 pg    MCHC 33.3 31.5 - 35.7 g/dL    RDW 12.7 12.3 - 15.4 %    RDW-SD 41.4 37.0 - 54.0 fl    MPV 11.5 6.0 - 12.0 fL    Platelets 204 140 - 450 10*3/mm3    Neutrophil % 63.4 42.7 - 76.0 %     Lymphocyte % 27.9 19.6 - 45.3 %    Monocyte % 7.4 5.0 - 12.0 %    Eosinophil % 0.5 0.3 - 6.2 %    Basophil % 0.5 0.0 - 1.5 %    Immature Grans % 0.3 0.0 - 0.5 %    Neutrophils, Absolute 3.83 1.70 - 7.00 10*3/mm3    Lymphocytes, Absolute 1.69 0.70 - 3.10 10*3/mm3    Monocytes, Absolute 0.45 0.10 - 0.90 10*3/mm3    Eosinophils, Absolute 0.03 0.00 - 0.40 10*3/mm3    Basophils, Absolute 0.03 0.00 - 0.20 10*3/mm3    Immature Grans, Absolute 0.02 0.00 - 0.05 10*3/mm3    nRBC 0.0 0.0 - 0.2 /100 WBC   Lipid Panel   Result Value Ref Range    Total Cholesterol 196 0 - 200 mg/dL    Triglycerides 71 0 - 150 mg/dL    HDL Cholesterol 74 (H) 40 - 60 mg/dL    LDL Cholesterol  108 (H) 0 - 100 mg/dL    VLDL Cholesterol 14.2 5 - 40 mg/dL    LDL/HDL Ratio 1.46    T4, Free   Result Value Ref Range    Free T4 1.73 (H) 0.93 - 1.70 ng/dL   TSH   Result Value Ref Range    TSH 0.162 (L) 0.270 - 4.200 uIU/mL   Vitamin D 25 Hydroxy   Result Value Ref Range    25 Hydroxy, Vitamin D 29.9 (L) 30.0 - 100.0 ng/ml     Dory was seen today for follow-up, hypothyroidism, hyperlipidemia and chest pain.    Diagnoses and all orders for this visit:    Mixed hyperlipidemia  -     Comprehensive Metabolic Panel  -     Lipid Panel    Essential hypertension  -     CBC Auto Differential    Acquired hypothyroidism  -     T4, Free  -     TSH    Precordial pain  -     Troponin I      Problem List Items Addressed This Visit        Cardiovascular and Mediastinum    Hypertension     Patient has been monitoring at home  Tachycardic with high diastolic- she is taking hctz daily   See noted readings  Patient just check bp 118/104  She is not sure cuff is working properly          Relevant Medications    hydroCHLOROthiazide (HYDRODIURIL) 25 MG tablet    Other Relevant Orders    CBC Auto Differential    Hyperlipidemia - Primary     Order for flp created   Will plan to do with follow up visit          Relevant Orders    Comprehensive Metabolic Panel    Lipid Panel        Endocrine    Hypothyroidism     Recent normal tfts reviewed with her   tsh in good range- continue current level of supplementation          Relevant Orders    T4, Free    TSH       Nervous and Auditory    Chest pain     With neck and shoulder pain, shortness of breath and nausea  Also a lot of anxiety   Discussed with PCP Dr Villagomez and he will see today with assessment  Discussed bout last Tuesday, possible evaluation with EKG and troponin discussed           Relevant Orders    Troponin I        Return in about 3 months (around 7/13/2020) for Recheck.    Berta Loja MD  Signed Berta Loja MD

## 2020-04-13 NOTE — ASSESSMENT & PLAN NOTE
Patient has been monitoring at home  Tachycardic with high diastolic- she is taking hctz daily   See noted readings  Patient just check bp 118/104  She is not sure cuff is working properly

## 2020-04-13 NOTE — ASSESSMENT & PLAN NOTE
With neck and shoulder pain, shortness of breath and nausea  Also a lot of anxiety   Discussed with PCP Dr Villagomez and he will see today with assessment  Discussed bout last Tuesday, possible evaluation with EKG and troponin discussed

## 2020-04-14 ENCOUNTER — TELEPHONE (OUTPATIENT)
Dept: ENDOCRINOLOGY | Facility: CLINIC | Age: 55
End: 2020-04-14

## 2020-04-14 NOTE — TELEPHONE ENCOUNTER
Pt saw Dr Villagomez this morning because she wanted fasting labs.  BP was normal and pulse was elevated at 104, EKG was normal - labs were drawn including a troponin level and she was advised to continue HCTZ and wait for lab results.  After she go home she had an episode of feeling very weird and checked BP and it was 124/94 and pulse was 111  Pt was advised to call us with lab results once received  She voiced understanding

## 2020-04-14 NOTE — TELEPHONE ENCOUNTER
Please call and get update/be sure she saw Dr Villagomez  Thanks, [facility] [address]

## 2020-07-09 RX ORDER — LEVOTHYROXINE SODIUM 0.07 MG/1
TABLET ORAL
Qty: 90 TABLET | Refills: 1 | Status: SHIPPED | OUTPATIENT
Start: 2020-07-09 | End: 2021-01-14

## 2020-07-17 ENCOUNTER — OFFICE VISIT (OUTPATIENT)
Dept: ENDOCRINOLOGY | Facility: CLINIC | Age: 55
End: 2020-07-17

## 2020-07-17 ENCOUNTER — HOSPITAL ENCOUNTER (OUTPATIENT)
Dept: GENERAL RADIOLOGY | Facility: HOSPITAL | Age: 55
Discharge: HOME | End: 2020-07-17
Admitting: INTERNAL MEDICINE

## 2020-07-17 ENCOUNTER — LAB (OUTPATIENT)
Dept: LAB | Facility: HOSPITAL | Age: 55
End: 2020-07-17

## 2020-07-17 VITALS
WEIGHT: 160.2 LBS | HEART RATE: 82 BPM | OXYGEN SATURATION: 99 % | DIASTOLIC BLOOD PRESSURE: 80 MMHG | SYSTOLIC BLOOD PRESSURE: 130 MMHG | BODY MASS INDEX: 23.66 KG/M2

## 2020-07-17 DIAGNOSIS — E78.2 MIXED HYPERLIPIDEMIA: Primary | ICD-10-CM

## 2020-07-17 DIAGNOSIS — E03.9 ACQUIRED HYPOTHYROIDISM: ICD-10-CM

## 2020-07-17 DIAGNOSIS — M95.2 SKULL DEFORMITY: ICD-10-CM

## 2020-07-17 DIAGNOSIS — I10 ESSENTIAL HYPERTENSION: ICD-10-CM

## 2020-07-17 DIAGNOSIS — E55.9 VITAMIN D DEFICIENCY: ICD-10-CM

## 2020-07-17 PROCEDURE — 82306 VITAMIN D 25 HYDROXY: CPT | Performed by: INTERNAL MEDICINE

## 2020-07-17 PROCEDURE — 84443 ASSAY THYROID STIM HORMONE: CPT | Performed by: INTERNAL MEDICINE

## 2020-07-17 PROCEDURE — 86235 NUCLEAR ANTIGEN ANTIBODY: CPT

## 2020-07-17 PROCEDURE — 80061 LIPID PANEL: CPT | Performed by: INTERNAL MEDICINE

## 2020-07-17 PROCEDURE — 80053 COMPREHEN METABOLIC PANEL: CPT | Performed by: INTERNAL MEDICINE

## 2020-07-17 PROCEDURE — 70250 X-RAY EXAM OF SKULL: CPT

## 2020-07-17 PROCEDURE — 84439 ASSAY OF FREE THYROXINE: CPT | Performed by: INTERNAL MEDICINE

## 2020-07-17 PROCEDURE — 86225 DNA ANTIBODY NATIVE: CPT | Performed by: INTERNAL MEDICINE

## 2020-07-17 PROCEDURE — 82607 VITAMIN B-12: CPT | Performed by: INTERNAL MEDICINE

## 2020-07-17 PROCEDURE — 99214 OFFICE O/P EST MOD 30 MIN: CPT | Performed by: INTERNAL MEDICINE

## 2020-07-17 PROCEDURE — 86038 ANTINUCLEAR ANTIBODIES: CPT | Performed by: INTERNAL MEDICINE

## 2020-07-17 NOTE — PROGRESS NOTES
Dory Mccord 55 y.o.  CC:Follow-up; Hypothyroidism; and Hyperlipidemia      [place]: Follow-up; Hypothyroidism; and Hyperlipidemia    Is on synthroid 75 mcg daily   Is on low fat diet   Is on vitamin D supplement   Is on B12 supplement  Noted 2 lines along scalp  Hair thinning with burning and itching scalp   Itching all over at times  Skin tags  Cramps in feet and lateral legs  Cramps in fingers  More vertigo - using meclizine prn   Has allergies- we prescribed dymista but insurance wont cover    Skull hyperostosis - more prominent frontally     Allergies   Allergen Reactions   • Flagyl [Metronidazole] Itching   • Oxycodone-Acetaminophen Itching       Current Outpatient Medications:   •  Azelastine-Fluticasone 137-50 MCG/ACT suspension, 1 spray into the nostril(s) as directed by provider 2 (Two) Times a Day., Disp: 23 g, Rfl: 5  •  Cholecalciferol (VITAMIN D) 1000 UNITS tablet, Take  by mouth., Disp: , Rfl:   •  Cyanocobalamin ER (RA VITAMIN B-12 TR) 1000 MCG tablet controlled-release, Take  by mouth., Disp: , Rfl:   •  esomeprazole (NEXIUM) 20 MG capsule, Take 40 mg by mouth., Disp: , Rfl:   •  FIBER COMPLETE PO, Take  by mouth 2 (Two) Times a Day., Disp: , Rfl:   •  fluticasone (FLONASE) 50 MCG/ACT nasal spray, into each nostril daily., Disp: , Rfl:   •  hydroCHLOROthiazide (HYDRODIURIL) 25 MG tablet, , Disp: , Rfl:   •  levothyroxine (SYNTHROID, LEVOTHROID) 75 MCG tablet, TAKE 1 TABLET BY MOUTH EVERY DAY, Disp: 90 tablet, Rfl: 1  •  LORazepam (ATIVAN) 0.5 MG tablet, Take  by mouth., Disp: , Rfl:   Patient Active Problem List    Diagnosis   • Chronic maxillary sinusitis [J32.0]   • Diverticulitis [K57.92]   • Acute stress disorder [F43.0]   • Atrial premature depolarization [I49.1]   • Chest pain [R07.9]   • Hiatal hernia [K44.9]   • Hyperlipidemia [E78.5]   • Hypertension [I10]   • Hypothyroidism [E03.9]   • Irritable bowel syndrome [K58.9]   • Uterine leiomyoma [D25.9]   • Mass of breast [N63.0]   • Calculus of  kidney [N20.0]   • Obstructive sleep apnea syndrome [G47.33]   • Cyst of ovary [N83.209]   • Palpitations [R00.2]   • Seasonal allergic rhinitis [J30.2]   • Vitamin D deficiency [E55.9]     Review of Systems   Constitutional: Negative for activity change, appetite change, chills, diaphoresis, fatigue, fever and unexpected weight change.   HENT: Negative for congestion, dental problem, drooling, ear discharge, ear pain, facial swelling, hearing loss, mouth sores, nosebleeds, postnasal drip, rhinorrhea, sinus pressure, sneezing, sore throat, tinnitus, trouble swallowing and voice change.    Eyes: Negative for photophobia, pain, discharge, redness, itching and visual disturbance.   Respiratory: Negative for apnea, cough, choking, chest tightness, shortness of breath, wheezing and stridor.    Cardiovascular: Negative for chest pain, palpitations and leg swelling.   Gastrointestinal: Negative for abdominal distention, abdominal pain, anal bleeding, blood in stool, constipation, diarrhea, nausea, rectal pain and vomiting.   Endocrine: Negative for cold intolerance, heat intolerance, polydipsia, polyphagia and polyuria.   Genitourinary: Negative for decreased urine volume, difficulty urinating, dysuria, enuresis, flank pain, frequency, genital sores, hematuria and urgency.   Musculoskeletal: Positive for arthralgias. Negative for back pain, gait problem, joint swelling, myalgias, neck pain and neck stiffness.        Skull hyperostosis    Skin: Negative for color change, pallor, rash and wound.        Hair changes   Itching    Allergic/Immunologic: Negative for environmental allergies, food allergies and immunocompromised state.   Neurological: Negative for dizziness, tremors, seizures, syncope, facial asymmetry, speech difficulty, weakness, light-headedness, numbness and headaches.   Hematological: Negative for adenopathy. Does not bruise/bleed easily.   Psychiatric/Behavioral: Negative for agitation, behavioral problems,  confusion, decreased concentration, dysphoric mood, hallucinations, self-injury, sleep disturbance and suicidal ideas. The patient is not nervous/anxious and is not hyperactive.      Social History     Socioeconomic History   • Marital status:      Spouse name: Not on file   • Number of children: Not on file   • Years of education: Not on file   • Highest education level: Not on file   Tobacco Use   • Smoking status: Former Smoker     Packs/day: 0.00     Years: 0.00     Pack years: 0.00     Types: Cigarettes   • Smokeless tobacco: Never Used   Substance and Sexual Activity   • Alcohol use: Yes     Alcohol/week: 3.0 standard drinks     Types: 3 drink(s) per week     Comment: 4 drinks per week   • Drug use: No   • Sexual activity: Yes     Partners: Male     Birth control/protection: Post-menopausal     Family History   Problem Relation Age of Onset   • Diabetes Mother    • Heart disease Mother    • Melanoma Mother    • Cancer Father         Lung cancer   • Cancer Maternal Grandmother         Breast cancer   • Arthritis Other    • Breast cancer Other    • Hyperlipidemia Other    • Hypertension Other    • Lung cancer Other    • Stroke Other      /80   Pulse 82   Wt 72.7 kg (160 lb 3.2 oz)   SpO2 99%   BMI 23.66 kg/m²   Physical Exam   Constitutional: She is oriented to person, place, and time. She appears well-developed and well-nourished.   HENT:   Head: Normocephalic and atraumatic.   Nose: Nose normal.   Mouth/Throat: Oropharynx is clear and moist.   Eyes: Pupils are equal, round, and reactive to light. Conjunctivae, EOM and lids are normal.   Neck: Trachea normal and normal range of motion. Neck supple. Carotid bruit is not present. No tracheal deviation present. No thyroid mass and no thyromegaly present.   Cardiovascular: Normal rate, regular rhythm, normal heart sounds and intact distal pulses. Exam reveals no gallop and no friction rub.   No murmur heard.  Pulmonary/Chest: Effort normal and  breath sounds normal. No respiratory distress. She has no wheezes.   Musculoskeletal: Normal range of motion. She exhibits no edema or deformity.   Lymphadenopathy:     She has no cervical adenopathy.   Neurological: She is alert and oriented to person, place, and time. She has normal reflexes. She displays normal reflexes. No cranial nerve deficit.   Skin: Skin is warm and dry. No rash noted. No cyanosis or erythema. Nails show no clubbing.   Psychiatric: She has a normal mood and affect. Her speech is normal and behavior is normal. Judgment and thought content normal. Cognition and memory are normal.   Nursing note and vitals reviewed.    Results for orders placed or performed in visit on 01/16/20   Comprehensive Metabolic Panel   Result Value Ref Range    Glucose 85 65 - 99 mg/dL    BUN 12 6 - 20 mg/dL    Creatinine 0.82 0.57 - 1.00 mg/dL    Sodium 146 (H) 136 - 145 mmol/L    Potassium 4.8 3.5 - 5.2 mmol/L    Chloride 106 98 - 107 mmol/L    CO2 28.0 22.0 - 29.0 mmol/L    Calcium 10.2 8.6 - 10.5 mg/dL    Total Protein 7.7 6.0 - 8.5 g/dL    Albumin 4.60 3.50 - 5.20 g/dL    ALT (SGPT) 22 1 - 33 U/L    AST (SGOT) 18 1 - 32 U/L    Alkaline Phosphatase 61 39 - 117 U/L    Total Bilirubin 0.5 0.2 - 1.2 mg/dL    eGFR Non African Amer 72 >60 mL/min/1.73    Globulin 3.1 gm/dL    A/G Ratio 1.5 g/dL    BUN/Creatinine Ratio 14.6 7.0 - 25.0    Anion Gap 12.0 5.0 - 15.0 mmol/L   CBC Auto Differential   Result Value Ref Range    WBC 6.05 3.40 - 10.80 10*3/mm3    RBC 4.48 3.77 - 5.28 10*6/mm3    Hemoglobin 13.4 12.0 - 15.9 g/dL    Hematocrit 40.3 34.0 - 46.6 %    MCV 90.0 79.0 - 97.0 fL    MCH 29.9 26.6 - 33.0 pg    MCHC 33.3 31.5 - 35.7 g/dL    RDW 12.7 12.3 - 15.4 %    RDW-SD 41.4 37.0 - 54.0 fl    MPV 11.5 6.0 - 12.0 fL    Platelets 204 140 - 450 10*3/mm3    Neutrophil % 63.4 42.7 - 76.0 %    Lymphocyte % 27.9 19.6 - 45.3 %    Monocyte % 7.4 5.0 - 12.0 %    Eosinophil % 0.5 0.3 - 6.2 %    Basophil % 0.5 0.0 - 1.5 %    Immature  Grans % 0.3 0.0 - 0.5 %    Neutrophils, Absolute 3.83 1.70 - 7.00 10*3/mm3    Lymphocytes, Absolute 1.69 0.70 - 3.10 10*3/mm3    Monocytes, Absolute 0.45 0.10 - 0.90 10*3/mm3    Eosinophils, Absolute 0.03 0.00 - 0.40 10*3/mm3    Basophils, Absolute 0.03 0.00 - 0.20 10*3/mm3    Immature Grans, Absolute 0.02 0.00 - 0.05 10*3/mm3    nRBC 0.0 0.0 - 0.2 /100 WBC   Lipid Panel   Result Value Ref Range    Total Cholesterol 196 0 - 200 mg/dL    Triglycerides 71 0 - 150 mg/dL    HDL Cholesterol 74 (H) 40 - 60 mg/dL    LDL Cholesterol  108 (H) 0 - 100 mg/dL    VLDL Cholesterol 14.2 5 - 40 mg/dL    LDL/HDL Ratio 1.46    T4, Free   Result Value Ref Range    Free T4 1.73 (H) 0.93 - 1.70 ng/dL   TSH   Result Value Ref Range    TSH 0.162 (L) 0.270 - 4.200 uIU/mL   Vitamin D 25 Hydroxy   Result Value Ref Range    25 Hydroxy, Vitamin D 29.9 (L) 30.0 - 100.0 ng/ml     Problem List Items Addressed This Visit        Cardiovascular and Mediastinum    Hypertension     bp is good   Continue monitoring and medication          Hyperlipidemia - Primary     Is on low fat diet   Check flp   Prior higher number   Not on therapy currently          Relevant Orders    Comprehensive Metabolic Panel       Digestive    Vitamin D deficiency     Is on vitamin D supplement   Update level          Relevant Orders    Vitamin D 25 Hydroxy    Vitamin B12       Endocrine    Hypothyroidism     Is on levothyroxine 75 mcg daily   Check tfts          Relevant Orders    TSH    T4, Free    KAREN With / DsDNA, RNP, Sjogrens A / B, Smith    Lipid Panel       Musculoskeletal and Integument    Skull deformity     Check skull xrays  She feels changes are new and more pronounced          Relevant Orders    XR skull lateral and ap (Completed)        Return in about 6 months (around 1/17/2021).    Peyton Jones MA  Signed Berta Loja MD

## 2020-07-18 LAB
25(OH)D3 SERPL-MCNC: 53.7 NG/ML (ref 30–100)
ALBUMIN SERPL-MCNC: 4.4 G/DL (ref 3.5–5.2)
ALBUMIN/GLOB SERPL: 1.4 G/DL
ALP SERPL-CCNC: 63 U/L (ref 39–117)
ALT SERPL W P-5'-P-CCNC: 22 U/L (ref 1–33)
ANION GAP SERPL CALCULATED.3IONS-SCNC: 12.4 MMOL/L (ref 5–15)
AST SERPL-CCNC: 20 U/L (ref 1–32)
BILIRUB SERPL-MCNC: 0.5 MG/DL (ref 0–1.2)
BUN SERPL-MCNC: 18 MG/DL (ref 6–20)
BUN/CREAT SERPL: 21.7 (ref 7–25)
CALCIUM SPEC-SCNC: 10.3 MG/DL (ref 8.6–10.5)
CHLORIDE SERPL-SCNC: 99 MMOL/L (ref 98–107)
CHOLEST SERPL-MCNC: 218 MG/DL (ref 0–200)
CO2 SERPL-SCNC: 27.6 MMOL/L (ref 22–29)
CREAT SERPL-MCNC: 0.83 MG/DL (ref 0.57–1)
GFR SERPL CREATININE-BSD FRML MDRD: 71 ML/MIN/1.73
GLOBULIN UR ELPH-MCNC: 3.1 GM/DL
GLUCOSE SERPL-MCNC: 85 MG/DL (ref 65–99)
HDLC SERPL-MCNC: 81 MG/DL (ref 40–60)
LDLC SERPL CALC-MCNC: 121 MG/DL (ref 0–100)
LDLC/HDLC SERPL: 1.5 {RATIO}
POTASSIUM SERPL-SCNC: 4.2 MMOL/L (ref 3.5–5.2)
PROT SERPL-MCNC: 7.5 G/DL (ref 6–8.5)
SODIUM SERPL-SCNC: 139 MMOL/L (ref 136–145)
T4 FREE SERPL-MCNC: 1.68 NG/DL (ref 0.93–1.7)
TRIGL SERPL-MCNC: 79 MG/DL (ref 0–150)
TSH SERPL DL<=0.05 MIU/L-ACNC: 3.94 UIU/ML (ref 0.27–4.2)
VIT B12 BLD-MCNC: >2000 PG/ML (ref 211–946)
VLDLC SERPL-MCNC: 15.8 MG/DL (ref 5–40)

## 2020-07-20 LAB
ANA SER QL: POSITIVE
CENTROMERE B AB SER-ACNC: <0.2 AI (ref 0–0.9)
CHROMATIN AB SERPL-ACNC: <0.2 AI (ref 0–0.9)
DSDNA AB SER-ACNC: 1 IU/ML (ref 0–9)
ENA JO1 AB SER-ACNC: <0.2 AI (ref 0–0.9)
ENA RNP AB SER-ACNC: 2.3 AI (ref 0–0.9)
ENA SCL70 AB SER-ACNC: <0.2 AI (ref 0–0.9)
ENA SM AB SER-ACNC: <0.2 AI (ref 0–0.9)
ENA SS-A AB SER-ACNC: 0.4 AI (ref 0–0.9)
ENA SS-B AB SER-ACNC: <0.2 AI (ref 0–0.9)
Lab: ABNORMAL

## 2020-07-23 ENCOUNTER — TRANSCRIBE ORDERS (OUTPATIENT)
Dept: ADMINISTRATIVE | Facility: HOSPITAL | Age: 55
End: 2020-07-23

## 2020-07-23 DIAGNOSIS — R51.9 ACUTE NONINTRACTABLE HEADACHE, UNSPECIFIED HEADACHE TYPE: Primary | ICD-10-CM

## 2020-08-13 ENCOUNTER — HOSPITAL ENCOUNTER (OUTPATIENT)
Dept: MRI IMAGING | Facility: HOSPITAL | Age: 55
Discharge: HOME | End: 2020-08-13
Admitting: FAMILY MEDICINE

## 2020-08-13 DIAGNOSIS — R51.9 ACUTE NONINTRACTABLE HEADACHE, UNSPECIFIED HEADACHE TYPE: ICD-10-CM

## 2020-08-13 PROCEDURE — 70551 MRI BRAIN STEM W/O DYE: CPT

## 2020-09-08 ENCOUNTER — TRANSCRIBE ORDERS (OUTPATIENT)
Dept: ADMINISTRATIVE | Facility: HOSPITAL | Age: 55
End: 2020-09-08

## 2020-09-08 DIAGNOSIS — M79.672 LEFT FOOT PAIN: Primary | ICD-10-CM

## 2020-09-24 ENCOUNTER — APPOINTMENT (OUTPATIENT)
Dept: MRI IMAGING | Facility: HOSPITAL | Age: 55
End: 2020-09-24

## 2021-01-14 RX ORDER — LEVOTHYROXINE SODIUM 0.07 MG/1
TABLET ORAL
Qty: 90 TABLET | Refills: 1 | Status: SHIPPED | OUTPATIENT
Start: 2021-01-14 | End: 2021-07-13

## 2021-01-18 ENCOUNTER — LAB (OUTPATIENT)
Dept: LAB | Facility: HOSPITAL | Age: 56
End: 2021-01-18

## 2021-01-18 ENCOUNTER — OFFICE VISIT (OUTPATIENT)
Dept: ENDOCRINOLOGY | Facility: CLINIC | Age: 56
End: 2021-01-18

## 2021-01-18 VITALS
DIASTOLIC BLOOD PRESSURE: 80 MMHG | HEART RATE: 93 BPM | HEIGHT: 69 IN | SYSTOLIC BLOOD PRESSURE: 122 MMHG | WEIGHT: 160.4 LBS | BODY MASS INDEX: 23.76 KG/M2 | OXYGEN SATURATION: 99 %

## 2021-01-18 DIAGNOSIS — E78.2 MIXED HYPERLIPIDEMIA: ICD-10-CM

## 2021-01-18 DIAGNOSIS — I10 ESSENTIAL HYPERTENSION: ICD-10-CM

## 2021-01-18 DIAGNOSIS — R10.13 EPIGASTRIC PAIN: ICD-10-CM

## 2021-01-18 DIAGNOSIS — R76.8 POSITIVE ANA (ANTINUCLEAR ANTIBODY): ICD-10-CM

## 2021-01-18 DIAGNOSIS — E03.9 ACQUIRED HYPOTHYROIDISM: ICD-10-CM

## 2021-01-18 DIAGNOSIS — E55.9 VITAMIN D DEFICIENCY: Primary | ICD-10-CM

## 2021-01-18 LAB
25(OH)D3 SERPL-MCNC: 55.5 NG/ML (ref 30–100)
ALBUMIN SERPL-MCNC: 4.8 G/DL (ref 3.5–5.2)
ALBUMIN/GLOB SERPL: 2.2 G/DL
ALP SERPL-CCNC: 55 U/L (ref 39–117)
ALT SERPL W P-5'-P-CCNC: 19 U/L (ref 1–33)
ANION GAP SERPL CALCULATED.3IONS-SCNC: 10 MMOL/L (ref 5–15)
AST SERPL-CCNC: 17 U/L (ref 1–32)
BASOPHILS # BLD AUTO: 0.05 10*3/MM3 (ref 0–0.2)
BASOPHILS NFR BLD AUTO: 0.9 % (ref 0–1.5)
BILIRUB SERPL-MCNC: 0.5 MG/DL (ref 0–1.2)
BUN SERPL-MCNC: 15 MG/DL (ref 6–20)
BUN/CREAT SERPL: 20.8 (ref 7–25)
CALCIUM SPEC-SCNC: 9.9 MG/DL (ref 8.6–10.5)
CHLORIDE SERPL-SCNC: 105 MMOL/L (ref 98–107)
CHOLEST SERPL-MCNC: 209 MG/DL (ref 0–200)
CO2 SERPL-SCNC: 27 MMOL/L (ref 22–29)
CREAT SERPL-MCNC: 0.72 MG/DL (ref 0.57–1)
DEPRECATED RDW RBC AUTO: 40.2 FL (ref 37–54)
EOSINOPHIL # BLD AUTO: 0.04 10*3/MM3 (ref 0–0.4)
EOSINOPHIL NFR BLD AUTO: 0.8 % (ref 0.3–6.2)
ERYTHROCYTE [DISTWIDTH] IN BLOOD BY AUTOMATED COUNT: 12.2 % (ref 12.3–15.4)
GFR SERPL CREATININE-BSD FRML MDRD: 83 ML/MIN/1.73
GLOBULIN UR ELPH-MCNC: 2.2 GM/DL
GLUCOSE SERPL-MCNC: 92 MG/DL (ref 65–99)
HCT VFR BLD AUTO: 41.5 % (ref 34–46.6)
HDLC SERPL-MCNC: 67 MG/DL (ref 40–60)
HGB BLD-MCNC: 14 G/DL (ref 12–15.9)
IMM GRANULOCYTES # BLD AUTO: 0.02 10*3/MM3 (ref 0–0.05)
IMM GRANULOCYTES NFR BLD AUTO: 0.4 % (ref 0–0.5)
LDLC SERPL CALC-MCNC: 124 MG/DL (ref 0–100)
LDLC/HDLC SERPL: 1.82 {RATIO}
LIPASE SERPL-CCNC: 55 U/L (ref 13–60)
LYMPHOCYTES # BLD AUTO: 1.45 10*3/MM3 (ref 0.7–3.1)
LYMPHOCYTES NFR BLD AUTO: 27.4 % (ref 19.6–45.3)
MCH RBC QN AUTO: 30.6 PG (ref 26.6–33)
MCHC RBC AUTO-ENTMCNC: 33.7 G/DL (ref 31.5–35.7)
MCV RBC AUTO: 90.6 FL (ref 79–97)
MONOCYTES # BLD AUTO: 0.43 10*3/MM3 (ref 0.1–0.9)
MONOCYTES NFR BLD AUTO: 8.1 % (ref 5–12)
NEUTROPHILS NFR BLD AUTO: 3.3 10*3/MM3 (ref 1.7–7)
NEUTROPHILS NFR BLD AUTO: 62.4 % (ref 42.7–76)
NRBC BLD AUTO-RTO: 0 /100 WBC (ref 0–0.2)
PLATELET # BLD AUTO: 204 10*3/MM3 (ref 140–450)
PMV BLD AUTO: 11.5 FL (ref 6–12)
POTASSIUM SERPL-SCNC: 4.4 MMOL/L (ref 3.5–5.2)
PROT SERPL-MCNC: 7 G/DL (ref 6–8.5)
RBC # BLD AUTO: 4.58 10*6/MM3 (ref 3.77–5.28)
SODIUM SERPL-SCNC: 142 MMOL/L (ref 136–145)
T4 FREE SERPL-MCNC: 1.33 NG/DL (ref 0.93–1.7)
TRIGL SERPL-MCNC: 99 MG/DL (ref 0–150)
TSH SERPL DL<=0.05 MIU/L-ACNC: 1.26 UIU/ML (ref 0.27–4.2)
VLDLC SERPL-MCNC: 18 MG/DL (ref 5–40)
WBC # BLD AUTO: 5.29 10*3/MM3 (ref 3.4–10.8)

## 2021-01-18 PROCEDURE — 84484 ASSAY OF TROPONIN QUANT: CPT | Performed by: INTERNAL MEDICINE

## 2021-01-18 PROCEDURE — 82306 VITAMIN D 25 HYDROXY: CPT | Performed by: INTERNAL MEDICINE

## 2021-01-18 PROCEDURE — 83690 ASSAY OF LIPASE: CPT | Performed by: INTERNAL MEDICINE

## 2021-01-18 PROCEDURE — 85025 COMPLETE CBC W/AUTO DIFF WBC: CPT | Performed by: INTERNAL MEDICINE

## 2021-01-18 PROCEDURE — 99214 OFFICE O/P EST MOD 30 MIN: CPT | Performed by: INTERNAL MEDICINE

## 2021-01-18 PROCEDURE — 84443 ASSAY THYROID STIM HORMONE: CPT | Performed by: INTERNAL MEDICINE

## 2021-01-18 PROCEDURE — 80053 COMPREHEN METABOLIC PANEL: CPT | Performed by: INTERNAL MEDICINE

## 2021-01-18 PROCEDURE — 80061 LIPID PANEL: CPT | Performed by: INTERNAL MEDICINE

## 2021-01-18 PROCEDURE — 84439 ASSAY OF FREE THYROXINE: CPT | Performed by: INTERNAL MEDICINE

## 2021-01-18 NOTE — PROGRESS NOTES
Dory Mccord 55 y.o.  CC:Follow-up, Hypothyroidism, and Hyperlipidemia      [place]: Follow-up, Hypothyroidism, and Hyperlipidemia    1997 back pain - l5-s1 herniation   Spinal stenosis  [place] currently - with left leg pain  Trying to do stretches   bp is good  Is on vitamin D supplement  Is on synthroid 75 mcg daily   Mother cabg   PCP would like to try statin - she has been trying diet, exercise     Allergies   Allergen Reactions   • Flagyl [Metronidazole] Itching   • Oxycodone-Acetaminophen Itching       Current Outpatient Medications:   •  Azelastine-Fluticasone 137-50 MCG/ACT suspension, 1 spray into the nostril(s) as directed by provider 2 (Two) Times a Day., Disp: 23 g, Rfl: 5  •  Cholecalciferol (VITAMIN D) 1000 UNITS tablet, Take  by mouth., Disp: , Rfl:   •  Cyanocobalamin ER (RA VITAMIN B-12 TR) 1000 MCG tablet controlled-release, Take  by mouth., Disp: , Rfl:   •  esomeprazole (NEXIUM) 20 MG capsule, Take 40 mg by mouth., Disp: , Rfl:   •  FIBER COMPLETE PO, Take  by mouth 2 (Two) Times a Day., Disp: , Rfl:   •  fluticasone (FLONASE) 50 MCG/ACT nasal spray, into each nostril daily., Disp: , Rfl:   •  hydroCHLOROthiazide (HYDRODIURIL) 25 MG tablet, , Disp: , Rfl:   •  levothyroxine (SYNTHROID, LEVOTHROID) 75 MCG tablet, TAKE 1 TABLET BY MOUTH EVERY DAY, Disp: 90 tablet, Rfl: 1  •  LORazepam (ATIVAN) 0.5 MG tablet, Take  by mouth., Disp: , Rfl:   Patient Active Problem List    Diagnosis   • 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]   • Chronic maxillary sinusitis [J32.0]   • Diverticulitis [K57.92]   • Acute stress disorder [F43.0]   • Atrial premature depolarization [I49.1]   • Chest pain [R07.9]   • Hiatal hernia [K44.9]   • Hyperlipidemia [E78.5]   • Hypertension [I10]   • Hypothyroidism [E03.9]   • Irritable bowel syndrome [K58.9]   • Uterine leiomyoma [D25.9]   • Mass of breast [N63.0]   • Calculus of kidney [N20.0]   • Obstructive sleep apnea syndrome [G47.33]   • Cyst of ovary [N83.209]   • Palpitations [R00.2]   •  Seasonal allergic rhinitis [J30.2]   • Vitamin D deficiency [E55.9]     Review of Systems   Constitutional: Negative for activity change, appetite change and unexpected weight change.   HENT: Negative for congestion and rhinorrhea.    Eyes: Negative for visual disturbance.   Respiratory: Negative for cough and shortness of breath.    Cardiovascular: Negative for palpitations and leg swelling.   Gastrointestinal: Negative for constipation, diarrhea and nausea.   Genitourinary: Negative for hematuria.   Musculoskeletal: Positive for back pain and gait problem. Negative for arthralgias, joint swelling and myalgias.   Skin: Negative for color change, rash and wound.   Allergic/Immunologic: Negative for environmental allergies, food allergies and immunocompromised state.   Neurological: Negative for dizziness, weakness and light-headedness.   Psychiatric/Behavioral: Negative for confusion, decreased concentration, dysphoric mood and sleep disturbance. The patient is not nervous/anxious.      Social History     Socioeconomic History   • Marital status:      Spouse name: Not on file   • Number of children: Not on file   • Years of education: Not on file   • Highest education level: Not on file   Tobacco Use   • Smoking status: Former Smoker     Packs/day: 0.00     Years: 0.00     Pack years: 0.00     Types: Cigarettes   • Smokeless tobacco: Never Used   Substance and Sexual Activity   • Alcohol use: Yes     Alcohol/week: 3.0 standard drinks     Types: 3 drink(s) per week     Comment: 4 drinks per week   • Drug use: No   • Sexual activity: Yes     Partners: Male     Birth control/protection: Post-menopausal     Family History   Problem Relation Age of Onset   • Diabetes Mother    • Heart disease Mother    • Melanoma Mother    • Cancer Father         Lung cancer   • Cancer Maternal Grandmother         Breast cancer   • Arthritis Other    • Breast cancer Other    • Hyperlipidemia Other    • Hypertension Other    • Lung  "cancer Other    • Stroke Other      /80   Pulse 93   Ht 175.3 cm (69\")   Wt 72.8 kg (160 lb 6.4 oz)   SpO2 99%   BMI 23.69 kg/m²   Physical Exam  Vitals signs and nursing note reviewed.   Constitutional:       Appearance: Normal appearance. She is well-developed.   HENT:      Head: Normocephalic and atraumatic.   Eyes:      General: Lids are normal.      Extraocular Movements: Extraocular movements intact.      Conjunctiva/sclera: Conjunctivae normal.      Pupils: Pupils are equal, round, and reactive to light.   Neck:      Musculoskeletal: Normal range of motion and neck supple.      Thyroid: No thyroid mass or thyromegaly.      Vascular: No carotid bruit.      Trachea: Trachea normal. No tracheal deviation.   Cardiovascular:      Rate and Rhythm: Normal rate and regular rhythm.      Heart sounds: Normal heart sounds. No murmur. No friction rub. No gallop.    Pulmonary:      Effort: Pulmonary effort is normal. No respiratory distress.      Breath sounds: Normal breath sounds. No wheezing.   Musculoskeletal: Normal range of motion.         General: No deformity.   Lymphadenopathy:      Cervical: No cervical adenopathy.   Skin:     General: Skin is warm and dry.      Findings: No erythema or rash.      Nails: There is no clubbing.     Neurological:      General: No focal deficit present.      Mental Status: She is alert and oriented to person, place, and time.      Cranial Nerves: No cranial nerve deficit.      Gait: Gait abnormal.      Deep Tendon Reflexes: Reflexes are normal and symmetric. Reflexes normal.      Comments: Left leg sciatic pain    Psychiatric:         Speech: Speech normal.         Behavior: Behavior normal.         Thought Content: Thought content normal.         Judgment: Judgment normal.       Results for orders placed or performed in visit on 07/17/20   Comprehensive Metabolic Panel    Specimen: Blood   Result Value Ref Range    Glucose 85 65 - 99 mg/dL    BUN 18 6 - 20 mg/dL    " Creatinine 0.83 0.57 - 1.00 mg/dL    Sodium 139 136 - 145 mmol/L    Potassium 4.2 3.5 - 5.2 mmol/L    Chloride 99 98 - 107 mmol/L    CO2 27.6 22.0 - 29.0 mmol/L    Calcium 10.3 8.6 - 10.5 mg/dL    Total Protein 7.5 6.0 - 8.5 g/dL    Albumin 4.40 3.50 - 5.20 g/dL    ALT (SGPT) 22 1 - 33 U/L    AST (SGOT) 20 1 - 32 U/L    Alkaline Phosphatase 63 39 - 117 U/L    Total Bilirubin 0.5 0.0 - 1.2 mg/dL    eGFR Non African Amer 71 >60 mL/min/1.73    Globulin 3.1 gm/dL    A/G Ratio 1.4 g/dL    BUN/Creatinine Ratio 21.7 7.0 - 25.0    Anion Gap 12.4 5.0 - 15.0 mmol/L   TSH    Specimen: Blood   Result Value Ref Range    TSH 3.940 0.270 - 4.200 uIU/mL   T4, Free    Specimen: Blood   Result Value Ref Range    Free T4 1.68 0.93 - 1.70 ng/dL   KAREN With / DsDNA, RNP, Sjogrens A / B, Kim    Specimen: Blood   Result Value Ref Range    KAREN Direct Positive (A) Negative    Anti-DNA (DS) Ab Qn 1 0 - 9 IU/mL    RNP Antibodies 2.3 (H) 0.0 - 0.9 AI    Kim Antibodies <0.2 0.0 - 0.9 AI    Antiscleroderma-70 Antibodies <0.2 0.0 - 0.9 AI    YOLANDA SSA (RO) Ab 0.4 0.0 - 0.9 AI    YOLANDA SSB (LA) Ab <0.2 0.0 - 0.9 AI    Antichromatin Antibodies <0.2 0.0 - 0.9 AI    GEOVANNI-1 IgG <0.2 0.0 - 0.9 AI    Anti-Centromere B Antibodies <0.2 0.0 - 0.9 AI    See below: Comment    Vitamin D 25 Hydroxy    Specimen: Blood   Result Value Ref Range    25 Hydroxy, Vitamin D 53.7 30.0 - 100.0 ng/ml   Vitamin B12    Specimen: Blood   Result Value Ref Range    Vitamin B-12 >2,000 (H) 211 - 946 pg/mL   Lipid Panel    Specimen: Blood   Result Value Ref Range    Total Cholesterol 218 (H) 0 - 200 mg/dL    Triglycerides 79 0 - 150 mg/dL    HDL Cholesterol 81 (H) 40 - 60 mg/dL    LDL Cholesterol  121 (H) 0 - 100 mg/dL    VLDL Cholesterol 15.8 5 - 40 mg/dL    LDL/HDL Ratio 1.50      Problems Addressed this Visit        Other    Vitamin D deficiency - Primary    Relevant Orders    Vitamin D 25 Hydroxy    Hypothyroidism     Update tfts   Is on synthroid 75 mcg daily           Hypertension     bp is well controlled   Continue monitoring          Hyperlipidemia     Is on low fat diet - check flp            Other Visit Diagnoses     Positive KAREN (antinuclear antibody)        Relevant Orders    Ambulatory Referral to Rheumatology    Epigastric pain        Relevant Orders    Lipase      Diagnoses       Codes Comments    Vitamin D deficiency    -  Primary ICD-10-CM: E55.9  ICD-9-CM: 268.9     Essential hypertension     ICD-10-CM: I10  ICD-9-CM: 401.9     Acquired hypothyroidism     ICD-10-CM: E03.9  ICD-9-CM: 244.9     Mixed hyperlipidemia     ICD-10-CM: E78.2  ICD-9-CM: 272.2     Positive KAREN (antinuclear antibody)     ICD-10-CM: R76.8  ICD-9-CM: 795.79     Epigastric pain     ICD-10-CM: R10.13  ICD-9-CM: 789.06         Return in about 6 months (around 7/18/2021) for Recheck.    Peyton Jones MA  Signed Berta Loja MD

## 2021-01-19 LAB — TROPONIN I SERPL-MCNC: <0.01 NG/ML (ref 0–0.04)

## 2021-02-18 ENCOUNTER — TRANSCRIBE ORDERS (OUTPATIENT)
Dept: ADMINISTRATIVE | Facility: HOSPITAL | Age: 56
End: 2021-02-18

## 2021-02-18 DIAGNOSIS — M54.50 LOW BACK PAIN, UNSPECIFIED BACK PAIN LATERALITY, UNSPECIFIED CHRONICITY, UNSPECIFIED WHETHER SCIATICA PRESENT: Primary | ICD-10-CM

## 2021-03-04 ENCOUNTER — APPOINTMENT (OUTPATIENT)
Dept: MRI IMAGING | Facility: HOSPITAL | Age: 56
End: 2021-03-04

## 2021-03-05 ENCOUNTER — APPOINTMENT (OUTPATIENT)
Dept: MRI IMAGING | Facility: HOSPITAL | Age: 56
End: 2021-03-05

## 2021-03-09 ENCOUNTER — HOSPITAL ENCOUNTER (OUTPATIENT)
Dept: MRI IMAGING | Facility: HOSPITAL | Age: 56
Discharge: HOME | End: 2021-03-09
Admitting: ORTHOPAEDIC SURGERY

## 2021-03-09 DIAGNOSIS — M54.50 LOW BACK PAIN, UNSPECIFIED BACK PAIN LATERALITY, UNSPECIFIED CHRONICITY, UNSPECIFIED WHETHER SCIATICA PRESENT: ICD-10-CM

## 2021-03-09 PROCEDURE — 72158 MRI LUMBAR SPINE W/O & W/DYE: CPT

## 2021-03-09 PROCEDURE — A9577 INJ MULTIHANCE: HCPCS | Performed by: ORTHOPAEDIC SURGERY

## 2021-03-09 PROCEDURE — 0 GADOBENATE DIMEGLUMINE 529 MG/ML SOLUTION: Performed by: ORTHOPAEDIC SURGERY

## 2021-03-09 RX ADMIN — GADOBENATE DIMEGLUMINE 14 ML: 529 INJECTION, SOLUTION INTRAVENOUS at 10:32

## 2021-07-06 ENCOUNTER — OFFICE VISIT (OUTPATIENT)
Dept: NEUROSURGERY | Facility: CLINIC | Age: 56
End: 2021-07-06

## 2021-07-06 VITALS — HEIGHT: 69 IN | BODY MASS INDEX: 23.49 KG/M2 | WEIGHT: 158.6 LBS

## 2021-07-06 DIAGNOSIS — M51.36 DDD (DEGENERATIVE DISC DISEASE), LUMBAR: ICD-10-CM

## 2021-07-06 DIAGNOSIS — M54.16 LUMBAR RADICULOPATHY: Primary | ICD-10-CM

## 2021-07-06 DIAGNOSIS — M43.16 SPONDYLOLISTHESIS OF LUMBAR REGION: ICD-10-CM

## 2021-07-06 PROCEDURE — 99203 OFFICE O/P NEW LOW 30 MIN: CPT | Performed by: NEUROLOGICAL SURGERY

## 2021-07-06 RX ORDER — DICLOFENAC SODIUM 75 MG/1
TABLET, DELAYED RELEASE ORAL
COMMUNITY
End: 2021-07-27 | Stop reason: ALTCHOICE

## 2021-07-06 RX ORDER — METHOCARBAMOL 750 MG/1
TABLET, FILM COATED ORAL
COMMUNITY
Start: 2021-02-18 | End: 2021-08-19

## 2021-07-06 NOTE — PROGRESS NOTES
Patient: Dory Mccord  : 1965    Primary Care Provider: Josias Villagomez MD    Requesting Provider: As above        History    Chief Complaint: Low back and left leg pain.    History of Present Illness: Ms. Mccord is a 56-year-old unemployed woman who has had some back problems in the past.  Apparently she had an L5-S1 disc herniation present in .  Since  of this year she has had back pain that extends into the left hip and then down into the back or side of her calf to the ankle.  She has been treated by an orthopedic spine physician in [place].  She has had an epidural injection which helped some.  She has also had an SI block.  Several days ago she had an injection done in her left hip which was helpful.  She is worse with sitting.  She is better with lying down or walking.  She has no bowel or bladder dysfunction.  She is here for another opinion.    Review of Systems   Constitutional: Negative for activity change, appetite change, chills, diaphoresis, fatigue, fever and unexpected weight change.   HENT: Negative for congestion, dental problem, drooling, ear discharge, ear pain, facial swelling, hearing loss, mouth sores, nosebleeds, postnasal drip, rhinorrhea, sinus pressure, sinus pain, sneezing, sore throat, tinnitus, trouble swallowing and voice change.    Eyes: Negative for photophobia, pain, discharge, redness, itching and visual disturbance.   Respiratory: Negative for apnea, cough, choking, chest tightness, shortness of breath, wheezing and stridor.    Cardiovascular: Negative for chest pain, palpitations and leg swelling.   Gastrointestinal: Negative for abdominal distention, abdominal pain, anal bleeding, blood in stool, constipation, diarrhea, nausea, rectal pain and vomiting.   Endocrine: Negative for cold intolerance, heat intolerance, polydipsia, polyphagia and polyuria.   Genitourinary: Negative for decreased urine volume, difficulty urinating, dysuria, enuresis, flank pain,  "frequency, genital sores, hematuria and urgency.   Musculoskeletal: Positive for back pain and neck pain. Negative for arthralgias, gait problem, joint swelling, myalgias and neck stiffness.   Skin: Negative for color change, pallor, rash and wound.   Allergic/Immunologic: Negative for environmental allergies, food allergies and immunocompromised state.   Neurological: Negative for dizziness, tremors, seizures, syncope, facial asymmetry, speech difficulty, weakness, light-headedness, numbness and headaches.   Hematological: Negative for adenopathy. Does not bruise/bleed easily.   Psychiatric/Behavioral: Positive for sleep disturbance. Negative for agitation, behavioral problems, confusion, decreased concentration, dysphoric mood, hallucinations, self-injury and suicidal ideas. The patient is nervous/anxious. The patient is not hyperactive.        The patient's past medical history, past surgical history, family history, and social history have been reviewed at length in the electronic medical record.    Physical Exam:   Ht 175.3 cm (69\")   Wt 71.9 kg (158 lb 9.6 oz)   BMI 23.42 kg/m²   CONSTITUTIONAL: Patient is well-nourished, pleasant and appears stated age.  CV: Heart regular rate and rhythm without murmur, rub, or gallop.  PULMONARY: Lungs are clear to ascultation.  MUSCULOSKELETAL:  Straight leg raising is negative.  Nader's Sign is negative.  ROM in the low back is moderately limited in flexion.  Tenderness in the SI joint region is noted.  There is some tenderness over the left greater trochanter to palpation.  NEUROLOGICAL:  Orientation, memory, attention span, language function, and cognition have been examined and are intact.  Strength is intact in the lower extremities to direct testing.  Muscle tone is normal throughout.  Station and gait are normal.  Sensation is intact to light touch testing throughout.  Deep tendon reflexes are 1+ and symmetrical.  Coordination is intact.      Medical Decision " Making    Data Review:   (All imaging studies were personally reviewed unless stated otherwise)  Lumbar MRI study dated 3/9/2021 demonstrates some degenerative disc disease.  There is a disc protrusion into the recess on the left at L5-S1 that is best visualized on the sagittal imaging.  There is a low-grade listhesis of L4 on L5.    Diagnosis:   1.  Lumbar radiculopathy.  2.  L4-5 spondylolisthesis.  3.  Left L5-S1 disc herniation, possibly chronic.  4.  Possible left trochanteric bursitis.    Treatment Options:   She has another epidural injection scheduled and I think it is reasonable to repeat that a couple of times.  If she continues to have difficulties then I would probably consider additional studies.  I think using her Advil for the short-term is a reasonable option.  One might consider adding something such as gabapentin.       Diagnosis Plan   1. Lumbar radiculopathy     2. Spondylolisthesis of lumbar region     3. DDD (degenerative disc disease), lumbar         Scribed for Jim Hicks MD by Dina Busch CMA on 7/6/2021 11:30 EDT       I, Dr. Hicks, personally performed the services described in the documentation, as scribed in my presence, and it is both accurate and complete.

## 2021-07-13 RX ORDER — LEVOTHYROXINE SODIUM 0.07 MG/1
TABLET ORAL
Qty: 90 TABLET | Refills: 1 | Status: SHIPPED | OUTPATIENT
Start: 2021-07-13 | End: 2022-01-12

## 2021-07-27 ENCOUNTER — OFFICE VISIT (OUTPATIENT)
Dept: ENDOCRINOLOGY | Facility: CLINIC | Age: 56
End: 2021-07-27

## 2021-07-27 ENCOUNTER — LAB (OUTPATIENT)
Dept: LAB | Facility: HOSPITAL | Age: 56
End: 2021-07-27

## 2021-07-27 VITALS
DIASTOLIC BLOOD PRESSURE: 70 MMHG | OXYGEN SATURATION: 98 % | HEART RATE: 87 BPM | WEIGHT: 159.4 LBS | BODY MASS INDEX: 23.61 KG/M2 | HEIGHT: 69 IN | SYSTOLIC BLOOD PRESSURE: 124 MMHG

## 2021-07-27 DIAGNOSIS — E03.9 ACQUIRED HYPOTHYROIDISM: ICD-10-CM

## 2021-07-27 DIAGNOSIS — I10 ESSENTIAL HYPERTENSION: Primary | ICD-10-CM

## 2021-07-27 LAB
ALBUMIN SERPL-MCNC: 4.8 G/DL (ref 3.5–5.2)
ALBUMIN/GLOB SERPL: 1.8 G/DL
ALP SERPL-CCNC: 63 U/L (ref 39–117)
ALT SERPL W P-5'-P-CCNC: 22 U/L (ref 1–33)
ANION GAP SERPL CALCULATED.3IONS-SCNC: 10.8 MMOL/L (ref 5–15)
AST SERPL-CCNC: 24 U/L (ref 1–32)
BILIRUB SERPL-MCNC: 0.5 MG/DL (ref 0–1.2)
BUN SERPL-MCNC: 16 MG/DL (ref 6–20)
BUN/CREAT SERPL: 17.6 (ref 7–25)
CALCIUM SPEC-SCNC: 10.3 MG/DL (ref 8.6–10.5)
CHLORIDE SERPL-SCNC: 102 MMOL/L (ref 98–107)
CO2 SERPL-SCNC: 30.2 MMOL/L (ref 22–29)
CREAT SERPL-MCNC: 0.91 MG/DL (ref 0.57–1)
GFR SERPL CREATININE-BSD FRML MDRD: 63 ML/MIN/1.73
GLOBULIN UR ELPH-MCNC: 2.7 GM/DL
GLUCOSE SERPL-MCNC: 91 MG/DL (ref 65–99)
POTASSIUM SERPL-SCNC: 4.8 MMOL/L (ref 3.5–5.2)
PROT SERPL-MCNC: 7.5 G/DL (ref 6–8.5)
SODIUM SERPL-SCNC: 143 MMOL/L (ref 136–145)
T4 FREE SERPL-MCNC: 1.91 NG/DL (ref 0.93–1.7)
TSH SERPL DL<=0.05 MIU/L-ACNC: 1.22 UIU/ML (ref 0.27–4.2)

## 2021-07-27 PROCEDURE — 84443 ASSAY THYROID STIM HORMONE: CPT | Performed by: INTERNAL MEDICINE

## 2021-07-27 PROCEDURE — 84439 ASSAY OF FREE THYROXINE: CPT | Performed by: INTERNAL MEDICINE

## 2021-07-27 PROCEDURE — 99214 OFFICE O/P EST MOD 30 MIN: CPT | Performed by: INTERNAL MEDICINE

## 2021-07-27 PROCEDURE — 80053 COMPREHEN METABOLIC PANEL: CPT | Performed by: INTERNAL MEDICINE

## 2021-07-27 NOTE — PROGRESS NOTES
Dory Mccord 56 y.o.  CC:Follow-up, Hypothyroidism, Hyperlipidemia, and Vitamin D Deficiency    [place]: Follow-up, Hypothyroidism, Hyperlipidemia, and Vitamin D Deficiency    Has had back problems- multiple steroid injections  Is on synthroid 75 mcg daily   Is on low fat diet   Is on vitamin d supplement  Has had shots in back, left foot   Some sensory deficit - saw neurosurgeon who recommended continued injections  Also saw Dr Chow- cannot diagnose now but KAREN is suggestive  She is drinking tonic water for leg cramps  gerd worse    Allergies   Allergen Reactions   • Flagyl [Metronidazole] Itching   • Oxycodone-Acetaminophen Itching       Current Outpatient Medications:   •  Cholecalciferol (VITAMIN D) 1000 UNITS tablet, Take  by mouth., Disp: , Rfl:   •  Cyanocobalamin ER (RA VITAMIN B-12 TR) 1000 MCG tablet controlled-release, Take  by mouth., Disp: , Rfl:   •  esomeprazole (NEXIUM) 20 MG capsule, Take 40 mg by mouth., Disp: , Rfl:   •  FIBER COMPLETE PO, Take  by mouth 2 (Two) Times a Day., Disp: , Rfl:   •  fluticasone (FLONASE) 50 MCG/ACT nasal spray, into each nostril daily., Disp: , Rfl:   •  hydroCHLOROthiazide (HYDRODIURIL) 25 MG tablet, , Disp: , Rfl:   •  levothyroxine (SYNTHROID, LEVOTHROID) 75 MCG tablet, TAKE 1 TABLET BY MOUTH EVERY DAY, Disp: 90 tablet, Rfl: 1  •  LORazepam (ATIVAN) 0.5 MG tablet, Take  by mouth., Disp: , Rfl:   •  methocarbamol (ROBAXIN) 750 MG tablet, , Disp: , Rfl:   Patient Active Problem List    Diagnosis    • 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]    • Chronic maxillary sinusitis [J32.0]    • Diverticulitis [K57.92]    • Acute stress disorder [F43.0]    • Atrial premature depolarization [I49.1]    • Chest pain [R07.9]    • Hiatal hernia [K44.9]    • Hyperlipidemia [E78.5]    • Hypertension [I10]    • Hypothyroidism [E03.9]    • Irritable bowel syndrome [K58.9]    • Uterine leiomyoma [D25.9]    • Mass of breast [N63.0]    • Calculus of kidney [N20.0]    • Obstructive sleep apnea syndrome  [G47.33]    • Cyst of ovary [N83.209]    • Palpitations [R00.2]    • Seasonal allergic rhinitis [J30.2]    • Vitamin D deficiency [E55.9]      Review of Systems   Constitutional: Negative for activity change, appetite change and unexpected weight change.   HENT: Negative for congestion and rhinorrhea.    Eyes: Negative for visual disturbance.   Respiratory: Negative for cough and shortness of breath.    Cardiovascular: Negative for palpitations and leg swelling.   Gastrointestinal: Negative for constipation, diarrhea and nausea.   Genitourinary: Negative for hematuria.   Musculoskeletal: Positive for arthralgias and back pain. Negative for gait problem, joint swelling and myalgias.   Skin: Negative for color change, rash and wound.   Allergic/Immunologic: Negative for environmental allergies, food allergies and immunocompromised state.   Neurological: Negative for dizziness, weakness and light-headedness.   Psychiatric/Behavioral: Negative for confusion, decreased concentration, dysphoric mood and sleep disturbance. The patient is not nervous/anxious.      Social History     Socioeconomic History   • Marital status:      Spouse name: Not on file   • Number of children: Not on file   • Years of education: Not on file   • Highest education level: Not on file   Tobacco Use   • Smoking status: Former Smoker     Packs/day: 0.00     Years: 0.00     Pack years: 0.00     Types: Cigarettes   • Smokeless tobacco: Never Used   Vaping Use   • Vaping Use: Never used   Substance and Sexual Activity   • Alcohol use: Yes     Alcohol/week: 2.0 standard drinks     Types: 2 Cans of beer per week     Comment: 4 drinks per week   • Drug use: No   • Sexual activity: Yes     Partners: Male     Birth control/protection: Post-menopausal     Family History   Problem Relation Age of Onset   • Diabetes Mother    • Heart disease Mother    • Melanoma Mother    • Kidney disease Mother    • Cancer Father         Lung cancer   • Cancer  "Maternal Grandmother         Breast cancer   • Arthritis Other    • Breast cancer Other    • Hyperlipidemia Other    • Hypertension Other    • Lung cancer Other    • Stroke Other      /70   Pulse 87   Ht 175.3 cm (69\")   Wt 72.3 kg (159 lb 6.4 oz)   SpO2 98%   BMI 23.54 kg/m²   Physical Exam  Vitals and nursing note reviewed.   Constitutional:       Appearance: Normal appearance. She is well-developed.   HENT:      Head: Normocephalic and atraumatic.   Eyes:      General: Lids are normal.      Extraocular Movements: Extraocular movements intact.      Conjunctiva/sclera: Conjunctivae normal.      Pupils: Pupils are equal, round, and reactive to light.   Neck:      Thyroid: No thyroid mass or thyromegaly.      Vascular: No carotid bruit.      Trachea: Trachea normal. No tracheal deviation.   Cardiovascular:      Rate and Rhythm: Normal rate and regular rhythm.      Pulses: Normal pulses.      Heart sounds: Normal heart sounds. No murmur heard.   No friction rub. No gallop.    Pulmonary:      Effort: Pulmonary effort is normal. No respiratory distress.      Breath sounds: Normal breath sounds. No wheezing.   Musculoskeletal:         General: No deformity. Normal range of motion.      Cervical back: Normal range of motion and neck supple.   Lymphadenopathy:      Cervical: No cervical adenopathy.   Skin:     General: Skin is warm and dry.      Findings: No erythema or rash.      Nails: There is no clubbing.   Neurological:      Mental Status: She is alert and oriented to person, place, and time.      Cranial Nerves: No cranial nerve deficit.      Deep Tendon Reflexes: Reflexes are normal and symmetric. Reflexes normal.   Psychiatric:         Speech: Speech normal.         Behavior: Behavior normal.         Thought Content: Thought content normal.         Judgment: Judgment normal.       Results for orders placed or performed in visit on 01/18/21   Vitamin D 25 Hydroxy    Specimen: Blood   Result Value Ref " Range    25 Hydroxy, Vitamin D 55.5 30.0 - 100.0 ng/ml   Lipase    Specimen: Blood   Result Value Ref Range    Lipase 55 13 - 60 U/L     Diagnoses and all orders for this visit:    1. Essential hypertension (Primary)  Assessment & Plan:  bp is well controlled  Continue monitoring and hctz   Check potassium level     Orders:  -     Comprehensive Metabolic Panel    2. Acquired hypothyroidism  Assessment & Plan:  Update tfts  On synthroid 75 mcg daily     Orders:  -     T4, Free  -     TSH  -     Cancel: Lipid Panel  Return in about 6 months (around 1/27/2022) for Recheck.    Berta Loja MD  Signed Berta Loja MD

## 2021-08-19 ENCOUNTER — OFFICE VISIT (OUTPATIENT)
Dept: OBSTETRICS AND GYNECOLOGY | Facility: CLINIC | Age: 56
End: 2021-08-19

## 2021-08-19 VITALS
SYSTOLIC BLOOD PRESSURE: 138 MMHG | BODY MASS INDEX: 23.85 KG/M2 | WEIGHT: 161 LBS | DIASTOLIC BLOOD PRESSURE: 82 MMHG | HEIGHT: 69 IN

## 2021-08-19 DIAGNOSIS — Z12.39 ENCOUNTER FOR BREAST CANCER SCREENING USING NON-MAMMOGRAM MODALITY: ICD-10-CM

## 2021-08-19 DIAGNOSIS — Z01.419 WOMEN'S ANNUAL ROUTINE GYNECOLOGICAL EXAMINATION: Primary | ICD-10-CM

## 2021-08-19 DIAGNOSIS — Z78.0 POSTMENOPAUSAL STATUS: ICD-10-CM

## 2021-08-19 DIAGNOSIS — Z13.820 OSTEOPOROSIS SCREENING: ICD-10-CM

## 2021-08-19 DIAGNOSIS — N95.1 MENOPAUSAL SYMPTOMS: ICD-10-CM

## 2021-08-19 PROCEDURE — 99396 PREV VISIT EST AGE 40-64: CPT | Performed by: OBSTETRICS & GYNECOLOGY

## 2021-08-19 RX ORDER — AZELASTINE HYDROCHLORIDE, FLUTICASONE PROPIONATE 137; 50 UG/1; UG/1
1 SPRAY, METERED NASAL EVERY 12 HOURS
COMMUNITY

## 2021-08-19 RX ORDER — BACILLUS COAGULANS/INULIN 1B-250 MG
CAPSULE ORAL
COMMUNITY

## 2021-08-19 RX ORDER — CALCIUM POLYCARBOPHIL 625 MG
TABLET ORAL
COMMUNITY

## 2021-08-19 RX ORDER — ASCORBIC ACID 250 MG
TABLET ORAL
COMMUNITY

## 2021-08-19 NOTE — PROGRESS NOTES
GYN Annual Exam     CC - Here for annual exam.     Subjective   HPI  Dory Mccord is a 56 y.o. female, , who presents for annual well woman exam.  She is postmenopausal.  No LMP recorded. Patient has had an ablation..    Patient reports problems with: hot flashes and vaginal dryness.  Partner Status: Marital Status: .  New Partners since last visit: no Desires STD Screening: no    Last mammogram: 2020  Last Completed Mammogram     This patient has no relevant Health Maintenance data.        Last colonoscopy:   Last Completed Colonoscopy     This patient has no relevant Health Maintenance data.        Last DEXA: today   Last Pap : 2018 neg neg HPV  Last Completed Pap Smear     This patient has no relevant Health Maintenance data.        History of abnormal Pap smear: no    Additional OB/GYN History   Current contraception: contraceptive methods: Post menopausal status  Desires to: continue contraception  Family history of uterine, colon, breast, or ovarian cancer: yes - Breast CA-MGM  Performs monthly Self-Breast Exam: occ  Parental Hip Fracture: no  Exercises Regularly: no  Feelings of Anxiety or Depression: no    Tobacco Usage?: No   OB History        2    Para   2    Term                AB        Living   2       SAB        TAB        Ectopic        Molar        Multiple        Live Births   2                Health Maintenance   Topic Date Due   • Annual Gynecologic Pelvic and Breast Exam  Never done   • DXA SCAN  Never done   • ANNUAL PHYSICAL  Never done   • TDAP/TD VACCINES (1 - Tdap) Never done   • ZOSTER VACCINE (1 of 2) Never done   • HEPATITIS C SCREENING  Never done   • MAMMOGRAM  Never done   • PAP SMEAR  2021   • INFLUENZA VACCINE  10/01/2021   • LIPID PANEL  2022   • COLORECTAL CANCER SCREENING  2028   • COVID-19 Vaccine  Completed   • Pneumococcal Vaccine 0-64  Aged Out       The additional following portions of the patient's history were reviewed  "and updated as appropriate: allergies, current medications, past family history, past medical history, past social history, past surgical history and problem list.    Review of Systems   Constitutional: Negative.    HENT: Negative.    Eyes: Negative.    Respiratory: Negative.    Cardiovascular: Negative.    Gastrointestinal: Negative.    Endocrine: Positive for heat intolerance.   Genitourinary: Negative.    Musculoskeletal: Negative.    Skin: Negative.    Allergic/Immunologic: Negative.    Neurological: Negative.    Hematological: Negative.    Psychiatric/Behavioral: Negative.        I have reviewed and agree with the HPI, ROS, and historical information as entered above. Amanda Monroy MD    Objective   /82   Ht 175.3 cm (69\")   Wt 73 kg (161 lb)   BMI 23.78 kg/m²     Physical Exam  Vitals and nursing note reviewed. Exam conducted with a chaperone present.   Constitutional:       Appearance: She is well-developed.   HENT:      Head: Normocephalic and atraumatic.   Neck:      Thyroid: No thyroid mass or thyromegaly.   Cardiovascular:      Rate and Rhythm: Normal rate and regular rhythm.      Heart sounds: No murmur heard.     Pulmonary:      Effort: Pulmonary effort is normal. No retractions.      Breath sounds: Normal breath sounds. No wheezing, rhonchi or rales.   Chest:      Chest wall: No mass or tenderness.      Breasts:         Right: Normal. No mass, nipple discharge, skin change or tenderness.         Left: Normal. No mass, nipple discharge, skin change or tenderness.   Abdominal:      General: Bowel sounds are normal.      Palpations: Abdomen is soft. Abdomen is not rigid. There is no mass.      Tenderness: There is no abdominal tenderness. There is no guarding.      Hernia: No hernia is present. There is no hernia in the left inguinal area.   Genitourinary:     Labia:         Right: No rash, tenderness or lesion.         Left: No rash, tenderness or lesion.       Vagina: Normal. No vaginal " discharge or lesions.      Cervix: No cervical motion tenderness, discharge, lesion or cervical bleeding.      Uterus: Normal. Not enlarged, not fixed and not tender.       Adnexa:         Right: No mass or tenderness.          Left: No mass or tenderness.        Rectum: No external hemorrhoid.   Musculoskeletal:      Cervical back: Normal range of motion. No muscular tenderness.   Neurological:      Mental Status: She is alert and oriented to person, place, and time.   Psychiatric:         Behavior: Behavior normal.           Assessment/Plan     Encounter Diagnoses   Name Primary?   • Women's annual routine gynecological examination Yes   • Encounter for breast cancer screening using non-mammogram modality    • Osteoporosis screening    • Postmenopausal status    • Menopausal symptoms        Recommended use of Vitamin D replacement and getting adequate calcium in her diet. (1500mg)  Reviewed BDS.  Repeat in 2 years.  Consider prophylactic bisphosphanate given inability to get weigh bearing exercise due to degenerative back issues.  Reviewed monthly self breast exams.  Instructed to call with lumps, pain, or breast discharge.    Continue yearly mammography  Reviewed HPV guidelines.  Reviewed exercise as a preventative health measures.  Discussed options for vaginal dryness.  She does not want to try anything hormonal.   Info on [facility]  given.     Amanda Monroy MD   08/19/2021

## 2021-08-26 DIAGNOSIS — Z01.419 WOMEN'S ANNUAL ROUTINE GYNECOLOGICAL EXAMINATION: ICD-10-CM

## 2022-01-12 RX ORDER — LEVOTHYROXINE SODIUM 0.07 MG/1
TABLET ORAL
Qty: 90 TABLET | Refills: 0 | Status: SHIPPED | OUTPATIENT
Start: 2022-01-12 | End: 2022-04-18

## 2022-02-01 ENCOUNTER — LAB (OUTPATIENT)
Dept: LAB | Facility: HOSPITAL | Age: 57
End: 2022-02-01

## 2022-02-01 ENCOUNTER — OFFICE VISIT (OUTPATIENT)
Dept: ENDOCRINOLOGY | Facility: CLINIC | Age: 57
End: 2022-02-01

## 2022-02-01 VITALS
BODY MASS INDEX: 23.73 KG/M2 | HEART RATE: 59 BPM | WEIGHT: 160.2 LBS | DIASTOLIC BLOOD PRESSURE: 60 MMHG | SYSTOLIC BLOOD PRESSURE: 104 MMHG | HEIGHT: 69 IN | OXYGEN SATURATION: 100 %

## 2022-02-01 DIAGNOSIS — E03.9 ACQUIRED HYPOTHYROIDISM: ICD-10-CM

## 2022-02-01 DIAGNOSIS — I10 PRIMARY HYPERTENSION: ICD-10-CM

## 2022-02-01 DIAGNOSIS — E78.2 MIXED HYPERLIPIDEMIA: Primary | ICD-10-CM

## 2022-02-01 PROCEDURE — 80061 LIPID PANEL: CPT | Performed by: INTERNAL MEDICINE

## 2022-02-01 PROCEDURE — 84443 ASSAY THYROID STIM HORMONE: CPT | Performed by: INTERNAL MEDICINE

## 2022-02-01 PROCEDURE — 99214 OFFICE O/P EST MOD 30 MIN: CPT | Performed by: INTERNAL MEDICINE

## 2022-02-01 PROCEDURE — 80053 COMPREHEN METABOLIC PANEL: CPT | Performed by: INTERNAL MEDICINE

## 2022-02-01 PROCEDURE — 84439 ASSAY OF FREE THYROXINE: CPT | Performed by: INTERNAL MEDICINE

## 2022-02-01 NOTE — PROGRESS NOTES
Dory Mccord 56 y.o.  CC:Follow-up, Hypothyroidism, Hyperlipidemia, and Vitamin D Deficiency      "[employer]: Follow-up, Hypothyroidism, Hyperlipidemia, and Vitamin D Deficiency    bp is good  Had covid in December  Is on levothyroxine 75 mcg daily   Is on vitamin D supplement  Is on low fat diet     Allergies   Allergen Reactions   • Flagyl [Metronidazole] Itching   • Oxycodone-Acetaminophen Itching       Current Outpatient Medications:   •  ascorbic acid (VITAMIN C) 250 MG tablet, take 2 daily, Disp: , Rfl:   •  Azelastine-Fluticasone 137-50 MCG/ACT suspension, 1 spray into the nostril(s) as directed by provider Every 12 (Twelve) Hours., Disp: , Rfl:   •  Bacillus Coagulans-Inulin (Probiotic) 1-250 BILLION-MG capsule, once daily, Disp: , Rfl:   •  Cholecalciferol (VITAMIN D) 1000 UNITS tablet, Take  by mouth., Disp: , Rfl:   •  Cyanocobalamin ER (RA VITAMIN B-12 TR) 1000 MCG tablet controlled-release, Take  by mouth., Disp: , Rfl:   •  esomeprazole (NEXIUM) 20 MG capsule, Take 40 mg by mouth., Disp: , Rfl:   •  FIBER COMPLETE PO, Take  by mouth 2 (Two) Times a Day., Disp: , Rfl:   •  hydroCHLOROthiazide (HYDRODIURIL) 25 MG tablet, , Disp: , Rfl:   •  levothyroxine (SYNTHROID, LEVOTHROID) 75 MCG tablet, TAKE 1 TABLET BY MOUTH EVERY DAY, Disp: 90 tablet, Rfl: 0  •  LORazepam (ATIVAN) 0.5 MG tablet, Take  by mouth., Disp: , Rfl:   •  polycarbophil 625 MG tablet tablet, take 1 tablet once a day, Disp: , Rfl:   Patient Active Problem List    Diagnosis    • 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]    • Chronic maxillary sinusitis [J32.0]    • Diverticulitis [K57.92]    • Acute stress disorder [F43.0]    • Atrial premature depolarization [I49.1]    • Chest pain [R07.9]    • Hiatal hernia [K44.9]    • Hyperlipidemia [E78.5]    • Hypertension [I10]    • Hypothyroidism [E03.9]    • Irritable bowel syndrome [K58.9]    • Uterine leiomyoma [D25.9]    • Mass of breast [N63.0]    • Calculus of kidney [N20.0]    • Obstructive sleep apnea syndrome  [G47.33]    • Cyst of ovary [N83.209]    • Palpitations [R00.2]    • Seasonal allergic rhinitis [J30.2]    • Vitamin D deficiency [E55.9]      Review of Systems   Constitutional: Negative for activity change, appetite change and unexpected weight change.   HENT: Negative for congestion and rhinorrhea.    Eyes: Negative for visual disturbance.   Respiratory: Negative for cough and shortness of breath.    Cardiovascular: Negative for palpitations and leg swelling.   Gastrointestinal: Negative for constipation, diarrhea and nausea.   Genitourinary: Negative for hematuria.   Musculoskeletal: Negative for arthralgias, back pain, gait problem, joint swelling and myalgias.   Skin: Negative for color change, rash and wound.   Allergic/Immunologic: Negative for environmental allergies, food allergies and immunocompromised state.   Neurological: Negative for dizziness, weakness and light-headedness.   Psychiatric/Behavioral: Negative for confusion, decreased concentration, dysphoric mood and sleep disturbance. The patient is not nervous/anxious.      Social History     Socioeconomic History   • Marital status:    Tobacco Use   • Smoking status: Former Smoker     Packs/day: 0.00     Years: 0.00     Pack years: 0.00     Types: Cigarettes   • Smokeless tobacco: Never Used   Vaping Use   • Vaping Use: Never used   Substance and Sexual Activity   • Alcohol use: Yes     Alcohol/week: 2.0 standard drinks     Types: 2 Cans of beer per week     Comment: 4 drinks per week   • Drug use: No   • Sexual activity: Yes     Partners: Male     Birth control/protection: Post-menopausal     Family History   Problem Relation Age of Onset   • Diabetes Mother    • Heart disease Mother    • Melanoma Mother    • Kidney disease Mother    • Cancer Father         Lung cancer   • Cancer Maternal Grandmother         Breast cancer   • Arthritis Other    • Breast cancer Other    • Hyperlipidemia Other    • Hypertension Other    • Lung cancer Other   "  • Stroke Other      /60   Pulse 59   Ht 175.3 cm (69\")   Wt 72.7 kg (160 lb 3.2 oz)   SpO2 100%   BMI 23.66 kg/m²   Physical Exam  Vitals and nursing note reviewed.   Constitutional:       Appearance: Normal appearance. She is well-developed.   HENT:      Head: Normocephalic and atraumatic.   Eyes:      General: Lids are normal.      Extraocular Movements: Extraocular movements intact.      Conjunctiva/sclera: Conjunctivae normal.      Pupils: Pupils are equal, round, and reactive to light.   Neck:      Thyroid: No thyroid mass or thyromegaly.      Vascular: No carotid bruit.      Trachea: Trachea normal. No tracheal deviation.   Cardiovascular:      Rate and Rhythm: Normal rate and regular rhythm.      Pulses: Normal pulses.      Heart sounds: Normal heart sounds. No murmur heard.  No friction rub. No gallop.    Pulmonary:      Effort: Pulmonary effort is normal. No respiratory distress.      Breath sounds: Normal breath sounds. No wheezing.   Musculoskeletal:         General: No deformity. Normal range of motion.      Cervical back: Normal range of motion and neck supple.   Lymphadenopathy:      Cervical: No cervical adenopathy.   Skin:     General: Skin is warm and dry.      Findings: No erythema or rash.      Nails: There is no clubbing.   Neurological:      General: No focal deficit present.      Mental Status: She is alert and oriented to person, place, and time.      Cranial Nerves: No cranial nerve deficit.      Deep Tendon Reflexes: Reflexes are normal and symmetric. Reflexes normal.   Psychiatric:         Mood and Affect: Mood normal.         Speech: Speech normal.         Behavior: Behavior normal.         Thought Content: Thought content normal.         Judgment: Judgment normal.       Results for orders placed or performed in visit on 07/27/21   T4, Free    Specimen: Blood   Result Value Ref Range    Free T4 1.91 (H) 0.93 - 1.70 ng/dL   TSH    Specimen: Blood   Result Value Ref Range    TSH " 1.220 0.270 - 4.200 uIU/mL   Comprehensive Metabolic Panel    Specimen: Blood   Result Value Ref Range    Glucose 91 65 - 99 mg/dL    BUN 16 6 - 20 mg/dL    Creatinine 0.91 0.57 - 1.00 mg/dL    Sodium 143 136 - 145 mmol/L    Potassium 4.8 3.5 - 5.2 mmol/L    Chloride 102 98 - 107 mmol/L    CO2 30.2 (H) 22.0 - 29.0 mmol/L    Calcium 10.3 8.6 - 10.5 mg/dL    Total Protein 7.5 6.0 - 8.5 g/dL    Albumin 4.80 3.50 - 5.20 g/dL    ALT (SGPT) 22 1 - 33 U/L    AST (SGOT) 24 1 - 32 U/L    Alkaline Phosphatase 63 39 - 117 U/L    Total Bilirubin 0.5 0.0 - 1.2 mg/dL    eGFR Non African Amer 63 >60 mL/min/1.73    Globulin 2.7 gm/dL    A/G Ratio 1.8 g/dL    BUN/Creatinine Ratio 17.6 7.0 - 25.0    Anion Gap 10.8 5.0 - 15.0 mmol/L     Diagnoses and all orders for this visit:    1. Mixed hyperlipidemia (Primary)  Assessment & Plan:  Is eating low fat- check flp     Orders:  -     Lipid Panel    2. Primary hypertension  Assessment & Plan:  bp is well controlled  Continue monitoring and medication     Orders:  -     Comprehensive Metabolic Panel    3. Acquired hypothyroidism  Assessment & Plan:  Taking synthroid 75 mcg daily   Check tfts     Orders:  -     T4, Free  -     TSH  Return in about 6 months (around 8/1/2022) for Recheck.    Berta Loja MD  Signed Berta Loja MD

## 2022-02-02 LAB
ALBUMIN SERPL-MCNC: 4.8 G/DL (ref 3.5–5.2)
ALBUMIN/GLOB SERPL: 1.9 G/DL
ALP SERPL-CCNC: 64 U/L (ref 39–117)
ALT SERPL W P-5'-P-CCNC: 20 U/L (ref 1–33)
ANION GAP SERPL CALCULATED.3IONS-SCNC: 8.9 MMOL/L (ref 5–15)
AST SERPL-CCNC: 19 U/L (ref 1–32)
BILIRUB SERPL-MCNC: 0.5 MG/DL (ref 0–1.2)
BUN SERPL-MCNC: 13 MG/DL (ref 6–20)
BUN/CREAT SERPL: 13.7 (ref 7–25)
CALCIUM SPEC-SCNC: 10.4 MG/DL (ref 8.6–10.5)
CHLORIDE SERPL-SCNC: 103 MMOL/L (ref 98–107)
CHOLEST SERPL-MCNC: 216 MG/DL (ref 0–200)
CO2 SERPL-SCNC: 29.1 MMOL/L (ref 22–29)
CREAT SERPL-MCNC: 0.95 MG/DL (ref 0.57–1)
GFR SERPL CREATININE-BSD FRML MDRD: 60 ML/MIN/1.73
GLOBULIN UR ELPH-MCNC: 2.5 GM/DL
GLUCOSE SERPL-MCNC: 92 MG/DL (ref 65–99)
HDLC SERPL-MCNC: 67 MG/DL (ref 40–60)
LDLC SERPL CALC-MCNC: 135 MG/DL (ref 0–100)
LDLC/HDLC SERPL: 1.98 {RATIO}
POTASSIUM SERPL-SCNC: 4.6 MMOL/L (ref 3.5–5.2)
PROT SERPL-MCNC: 7.3 G/DL (ref 6–8.5)
SODIUM SERPL-SCNC: 141 MMOL/L (ref 136–145)
T4 FREE SERPL-MCNC: 1.64 NG/DL (ref 0.93–1.7)
TRIGL SERPL-MCNC: 82 MG/DL (ref 0–150)
TSH SERPL DL<=0.05 MIU/L-ACNC: 1.49 UIU/ML (ref 0.27–4.2)
VLDLC SERPL-MCNC: 14 MG/DL (ref 5–40)

## 2022-04-18 RX ORDER — LEVOTHYROXINE SODIUM 0.07 MG/1
TABLET ORAL
Qty: 90 TABLET | Refills: 1 | Status: SHIPPED | OUTPATIENT
Start: 2022-04-18 | End: 2022-08-18

## 2022-08-16 ENCOUNTER — LAB (OUTPATIENT)
Dept: LAB | Facility: HOSPITAL | Age: 57
End: 2022-08-16

## 2022-08-16 ENCOUNTER — OFFICE VISIT (OUTPATIENT)
Dept: ENDOCRINOLOGY | Facility: CLINIC | Age: 57
End: 2022-08-16

## 2022-08-16 VITALS
BODY MASS INDEX: 22.81 KG/M2 | WEIGHT: 154 LBS | SYSTOLIC BLOOD PRESSURE: 128 MMHG | HEART RATE: 57 BPM | HEIGHT: 69 IN | OXYGEN SATURATION: 100 % | DIASTOLIC BLOOD PRESSURE: 60 MMHG

## 2022-08-16 DIAGNOSIS — E78.2 MIXED HYPERLIPIDEMIA: Primary | ICD-10-CM

## 2022-08-16 DIAGNOSIS — E03.9 ACQUIRED HYPOTHYROIDISM: ICD-10-CM

## 2022-08-16 DIAGNOSIS — I10 PRIMARY HYPERTENSION: ICD-10-CM

## 2022-08-16 DIAGNOSIS — E55.9 VITAMIN D DEFICIENCY: ICD-10-CM

## 2022-08-16 LAB
ALBUMIN SERPL-MCNC: 4.4 G/DL (ref 3.5–5.2)
ALBUMIN/GLOB SERPL: 1.8 G/DL
ALP SERPL-CCNC: 61 U/L (ref 39–117)
ALT SERPL W P-5'-P-CCNC: 48 U/L (ref 1–33)
ANION GAP SERPL CALCULATED.3IONS-SCNC: 9 MMOL/L (ref 5–15)
AST SERPL-CCNC: 25 U/L (ref 1–32)
BILIRUB SERPL-MCNC: 0.6 MG/DL (ref 0–1.2)
BUN SERPL-MCNC: 14 MG/DL (ref 6–20)
BUN/CREAT SERPL: 16.5 (ref 7–25)
CALCIUM SPEC-SCNC: 10 MG/DL (ref 8.6–10.5)
CHLORIDE SERPL-SCNC: 100 MMOL/L (ref 98–107)
CHOLEST SERPL-MCNC: 218 MG/DL (ref 0–200)
CO2 SERPL-SCNC: 28 MMOL/L (ref 22–29)
CREAT SERPL-MCNC: 0.85 MG/DL (ref 0.57–1)
EGFRCR SERPLBLD CKD-EPI 2021: 79 ML/MIN/1.73
GLOBULIN UR ELPH-MCNC: 2.4 GM/DL
GLUCOSE SERPL-MCNC: 91 MG/DL (ref 65–99)
HDLC SERPL-MCNC: 74 MG/DL (ref 40–60)
LDLC SERPL CALC-MCNC: 128 MG/DL (ref 0–100)
LDLC/HDLC SERPL: 1.7 {RATIO}
POTASSIUM SERPL-SCNC: 4.4 MMOL/L (ref 3.5–5.2)
PROT SERPL-MCNC: 6.8 G/DL (ref 6–8.5)
SODIUM SERPL-SCNC: 137 MMOL/L (ref 136–145)
T4 FREE SERPL-MCNC: 1.45 NG/DL (ref 0.93–1.7)
TRIGL SERPL-MCNC: 91 MG/DL (ref 0–150)
TSH SERPL DL<=0.05 MIU/L-ACNC: 5.35 UIU/ML (ref 0.27–4.2)
VLDLC SERPL-MCNC: 16 MG/DL (ref 5–40)

## 2022-08-16 PROCEDURE — 80053 COMPREHEN METABOLIC PANEL: CPT | Performed by: INTERNAL MEDICINE

## 2022-08-16 PROCEDURE — 82306 VITAMIN D 25 HYDROXY: CPT | Performed by: INTERNAL MEDICINE

## 2022-08-16 PROCEDURE — 84443 ASSAY THYROID STIM HORMONE: CPT | Performed by: INTERNAL MEDICINE

## 2022-08-16 PROCEDURE — 99214 OFFICE O/P EST MOD 30 MIN: CPT | Performed by: INTERNAL MEDICINE

## 2022-08-16 PROCEDURE — 84439 ASSAY OF FREE THYROXINE: CPT | Performed by: INTERNAL MEDICINE

## 2022-08-16 PROCEDURE — 80061 LIPID PANEL: CPT | Performed by: INTERNAL MEDICINE

## 2022-08-16 NOTE — PROGRESS NOTES
Dory Mccord 57 y.o.  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   New skin lesion left leg - recommended derm     Allergies   Allergen Reactions   • Flagyl [Metronidazole] Itching   • Oxycodone-Acetaminophen Itching       Current Outpatient Medications:   •  ascorbic acid (VITAMIN C) 250 MG tablet, take 2 daily, Disp: , Rfl:   •  Azelastine-Fluticasone 137-50 MCG/ACT suspension, 1 spray into the nostril(s) as directed by provider Every 12 (Twelve) Hours., Disp: , Rfl:   •  Bacillus Coagulans-Inulin (Probiotic) 1-250 BILLION-MG capsule, once daily, Disp: , Rfl:   •  Cholecalciferol (VITAMIN D) 1000 UNITS tablet, Take  by mouth., Disp: , Rfl:   •  Cyanocobalamin ER (RA VITAMIN B-12 TR) 1000 MCG tablet controlled-release, Take  by mouth., Disp: , Rfl:   •  esomeprazole (nexIUM) 20 MG capsule, Take 20 mg by mouth., Disp: , Rfl:   •  FIBER COMPLETE PO, Take  by mouth 2 (Two) Times a Day., Disp: , Rfl:   •  hydroCHLOROthiazide (HYDRODIURIL) 25 MG tablet, , Disp: , Rfl:   •  levothyroxine (SYNTHROID, LEVOTHROID) 75 MCG tablet, TAKE 1 TABLET BY MOUTH EVERY DAY, Disp: 90 tablet, Rfl: 1  •  LORazepam (ATIVAN) 0.5 MG tablet, Take  by mouth., Disp: , Rfl:   •  polycarbophil 625 MG tablet tablet, take 1 tablet once a day, Disp: , Rfl:   Patient Active Problem List    Diagnosis    • 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]    • Chronic maxillary sinusitis [J32.0]    • Diverticulitis [K57.92]    • Acute stress disorder [F43.0]    • Atrial premature depolarization [I49.1]    • Chest pain [R07.9]    • Hiatal hernia [K44.9]    • Hyperlipidemia [E78.5]    • Hypertension [I10]    • Hypothyroidism [E03.9]    •  Irritable bowel syndrome [K58.9]    • Uterine leiomyoma [D25.9]    • Mass of breast [N63.0]    • Calculus of kidney [N20.0]    • Obstructive sleep apnea syndrome [G47.33]    • Cyst of ovary [N83.209]    • Palpitations [R00.2]    • Seasonal allergic rhinitis [J30.2]    • Vitamin D deficiency [E55.9]      Review of Systems   Constitutional: Negative for activity change, appetite change and unexpected weight change.   HENT: Negative for congestion and rhinorrhea.    Eyes: Negative for visual disturbance.   Respiratory: Negative for cough and shortness of breath.    Cardiovascular: Negative for palpitations and leg swelling.   Gastrointestinal: Negative for constipation, diarrhea and nausea.   Genitourinary: Negative for hematuria.   Musculoskeletal: Positive for arthralgias. Negative for back pain, gait problem, joint swelling and myalgias.   Skin: Negative for color change, rash and wound.        Left ant tib scaly lesion    Allergic/Immunologic: Negative for environmental allergies, food allergies and immunocompromised state.   Neurological: Negative for dizziness, weakness and light-headedness.   Psychiatric/Behavioral: Negative for confusion, decreased concentration, dysphoric mood and sleep disturbance. The patient is not nervous/anxious.      Social History     Socioeconomic History   • Marital status:    Tobacco Use   • Smoking status: Former Smoker     Packs/day: 0.00     Years: 0.00     Pack years: 0.00     Types: Cigarettes   • Smokeless tobacco: Never Used   Vaping Use   • Vaping Use: Never used   Substance and Sexual Activity   • Alcohol use: Yes     Alcohol/week: 2.0 standard drinks     Types: 2 Cans of beer per week     Comment: 4 drinks per week   • Drug use: No   • Sexual activity: Yes     Partners: Male     Birth control/protection: Post-menopausal     Family History   Problem Relation Age of Onset   • Diabetes Mother    • Heart disease Mother    • Melanoma Mother    • Kidney disease Mother    •  "Hypertension Mother    • Cancer Father         Lung cancer   • Cancer Maternal Grandmother         Breast cancer   • Arthritis Other    • Breast cancer Other    • Hyperlipidemia Other    • Hypertension Other    • Lung cancer Other    • Stroke Other      /60   Pulse 57   Ht 175.3 cm (69\")   Wt 69.9 kg (154 lb)   SpO2 100%   BMI 22.74 kg/m²   Physical Exam  Vitals and nursing note reviewed.   Constitutional:       Appearance: Normal appearance. She is well-developed.   HENT:      Head: Normocephalic and atraumatic.   Eyes:      General: Lids are normal.      Extraocular Movements: Extraocular movements intact.      Conjunctiva/sclera: Conjunctivae normal.      Pupils: Pupils are equal, round, and reactive to light.   Neck:      Thyroid: No thyroid mass or thyromegaly.      Vascular: No carotid bruit.      Trachea: Trachea normal. No tracheal deviation.   Cardiovascular:      Rate and Rhythm: Normal rate and regular rhythm.      Pulses: Normal pulses.      Heart sounds: Normal heart sounds. No murmur heard.    No friction rub. No gallop.   Pulmonary:      Effort: Pulmonary effort is normal. No respiratory distress.      Breath sounds: Normal breath sounds. No wheezing.   Musculoskeletal:         General: No deformity. Normal range of motion.      Cervical back: Normal range of motion and neck supple.   Lymphadenopathy:      Cervical: No cervical adenopathy.   Skin:     General: Skin is warm and dry.      Findings: No erythema or rash.      Nails: There is no clubbing.   Neurological:      General: No focal deficit present.      Mental Status: She is alert and oriented to person, place, and time.      Cranial Nerves: No cranial nerve deficit.      Deep Tendon Reflexes: Reflexes are normal and symmetric. Reflexes normal.   Psychiatric:         Mood and Affect: Mood normal.         Speech: Speech normal.         Behavior: Behavior normal.         Thought Content: Thought content normal.         Judgment: " Judgment normal.       Results for orders placed or performed in visit on 02/01/22   Comprehensive Metabolic Panel    Specimen: Blood   Result Value Ref Range    Glucose 92 65 - 99 mg/dL    BUN 13 6 - 20 mg/dL    Creatinine 0.95 0.57 - 1.00 mg/dL    Sodium 141 136 - 145 mmol/L    Potassium 4.6 3.5 - 5.2 mmol/L    Chloride 103 98 - 107 mmol/L    CO2 29.1 (H) 22.0 - 29.0 mmol/L    Calcium 10.4 8.6 - 10.5 mg/dL    Total Protein 7.3 6.0 - 8.5 g/dL    Albumin 4.80 3.50 - 5.20 g/dL    ALT (SGPT) 20 1 - 33 U/L    AST (SGOT) 19 1 - 32 U/L    Alkaline Phosphatase 64 39 - 117 U/L    Total Bilirubin 0.5 0.0 - 1.2 mg/dL    eGFR Non African Amer 60 (L) >60 mL/min/1.73    Globulin 2.5 gm/dL    A/G Ratio 1.9 g/dL    BUN/Creatinine Ratio 13.7 7.0 - 25.0    Anion Gap 8.9 5.0 - 15.0 mmol/L   T4, Free    Specimen: Blood   Result Value Ref Range    Free T4 1.64 0.93 - 1.70 ng/dL   TSH    Specimen: Blood   Result Value Ref Range    TSH 1.490 0.270 - 4.200 uIU/mL   Lipid Panel    Specimen: Blood   Result Value Ref Range    Total Cholesterol 216 (H) 0 - 200 mg/dL    Triglycerides 82 0 - 150 mg/dL    HDL Cholesterol 67 (H) 40 - 60 mg/dL    LDL Cholesterol  135 (H) 0 - 100 mg/dL    VLDL Cholesterol 14 5 - 40 mg/dL    LDL/HDL Ratio 1.98      Diagnoses and all orders for this visit:    1. Mixed hyperlipidemia (Primary)  Assessment & Plan:  Is on low fat diet   Does not want to take statin   Check flp     Orders:  -     Lipid Panel    2. Primary hypertension  Assessment & Plan:  bp is good  Continue current monitoring and medications     Orders:  -     Comprehensive Metabolic Panel    3. Acquired hypothyroidism  Assessment & Plan:  Is taking synthroid 75 mcg daily   Check tfts     Orders:  -     TSH  -     T4, Free    4. Vitamin D deficiency  Assessment & Plan:  Is on supplement   Check levels     Orders:  -     Vitamin D 25 Hydroxy  Return in about 6 months (around 2/16/2023) for Recheck.    Berta Loja MD  Signed Berta  [facility]

## 2022-08-17 LAB — 25(OH)D3 SERPL-MCNC: 67.7 NG/ML (ref 30–100)

## 2022-08-18 RX ORDER — LEVOTHYROXINE SODIUM 88 UG/1
88 TABLET ORAL DAILY
Qty: 90 TABLET | Refills: 1 | Status: SHIPPED | OUTPATIENT
Start: 2022-08-18 | End: 2022-12-02

## 2022-08-25 ENCOUNTER — OFFICE VISIT (OUTPATIENT)
Dept: OBSTETRICS AND GYNECOLOGY | Facility: CLINIC | Age: 57
End: 2022-08-25

## 2022-08-25 VITALS
BODY MASS INDEX: 22.81 KG/M2 | HEIGHT: 69 IN | DIASTOLIC BLOOD PRESSURE: 80 MMHG | SYSTOLIC BLOOD PRESSURE: 132 MMHG | WEIGHT: 154 LBS

## 2022-08-25 DIAGNOSIS — N90.89 VULVAR LESION: ICD-10-CM

## 2022-08-25 DIAGNOSIS — N89.8 VAGINAL DRYNESS: ICD-10-CM

## 2022-08-25 DIAGNOSIS — Z01.419 WOMEN'S ANNUAL ROUTINE GYNECOLOGICAL EXAMINATION: Primary | ICD-10-CM

## 2022-08-25 DIAGNOSIS — Z12.39 ENCOUNTER FOR BREAST CANCER SCREENING USING NON-MAMMOGRAM MODALITY: ICD-10-CM

## 2022-08-25 DIAGNOSIS — Z78.0 POSTMENOPAUSAL STATUS: ICD-10-CM

## 2022-08-25 PROCEDURE — 56605 BIOPSY OF VULVA/PERINEUM: CPT | Performed by: OBSTETRICS & GYNECOLOGY

## 2022-08-25 PROCEDURE — 99396 PREV VISIT EST AGE 40-64: CPT | Performed by: OBSTETRICS & GYNECOLOGY

## 2022-08-25 RX ORDER — ESTRADIOL 10 UG/1
1 INSERT VAGINAL DAILY
Qty: 14 TABLET | Refills: 0 | Status: SHIPPED | OUTPATIENT
Start: 2022-08-25 | End: 2022-09-08

## 2022-08-25 RX ORDER — ESTRADIOL 10 UG/1
1 INSERT VAGINAL 2 TIMES WEEKLY
Qty: 24 TABLET | Refills: 3 | Status: SHIPPED | OUTPATIENT
Start: 2022-08-25 | End: 2022-11-23

## 2022-08-25 NOTE — PROGRESS NOTES
Gynecologic Annual Exam Note        GYN Annual Exam     CC - Here for annual exam.        HPI  Doyr Mccord is a 57 y.o. female, , who presents for annual well woman exam as a established patient.  She is postmenopausal. Denies vaginal bleeding.  Patient reports problems with: possible skin tag on perineum. There were no changes to her medical or surgical history since her last visit.. Partner Status: Marital Status: .  She is is sexually active. She has not had new partners.. STD testing recommendations have been explained to the patient and she does not desire STD testing.    Additional OB/GYN History   On HRT? No    Last Pap : 21. Results: negative. HPV: negative  Last Completed Pap Smear          Ordered - PAP SMEAR (Every 3 Years) Ordered on 2021  SCANNED - PAP SMEAR    2018  Done - neg, neg HPV              History of abnormal Pap smear: no  Family history of uterine, colon, breast, or ovarian cancer: yes - MGM-Breast CA  Performs monthly Self-Breast Exam: yes  Last mammogram: 2021. Done in [place].    Last Completed Mammogram          MAMMOGRAM (Yearly) Next due on 2021  Done - nl per pt              Last colonoscopy: has had a colonoscopy 4 year(s) ago.    Last Completed Colonoscopy          COLORECTAL CANCER SCREENING (COLONOSCOPY - Every 10 Years) Next due on 2018  COLONOSCOPY (Done)                  Last bone density scan (DEXA): On 21 and results were Osteoporosis  Exercises Regularly: active  Feelings of Anxiety or Depression: no      Tobacco Usage?: No, former      Current Outpatient Medications:   •  ascorbic acid (VITAMIN C) 250 MG tablet, take 2 daily, Disp: , Rfl:   •  Azelastine-Fluticasone 137-50 MCG/ACT suspension, 1 spray into the nostril(s) as directed by provider Every 12 (Twelve) Hours., Disp: , Rfl:   •  Bacillus Coagulans-Inulin (Probiotic) 1-250 BILLION-MG capsule, once daily, Disp: , Rfl:   •   Cholecalciferol (VITAMIN D) 1000 UNITS tablet, Take  by mouth., Disp: , Rfl:   •  Cyanocobalamin ER (RA VITAMIN B-12 TR) 1000 MCG tablet controlled-release, Take  by mouth., Disp: , Rfl:   •  esomeprazole (nexIUM) 20 MG capsule, Take 20 mg by mouth., Disp: , Rfl:   •  FIBER COMPLETE PO, Take  by mouth 2 (Two) Times a Day., Disp: , Rfl:   •  hydroCHLOROthiazide (HYDRODIURIL) 25 MG tablet, , Disp: , Rfl:   •  levothyroxine (SYNTHROID, LEVOTHROID) 88 MCG tablet, Take 1 tablet by mouth Daily. Replaces 75 mcg daily dosing, Disp: 90 tablet, Rfl: 1  •  LORazepam (ATIVAN) 0.5 MG tablet, Take  by mouth., Disp: , Rfl:   •  polycarbophil 625 MG tablet tablet, take 1 tablet once a day, Disp: , Rfl:     Patient denies the need for medication refills today.    OB History        2    Para   2    Term                AB        Living   2       SAB        IAB        Ectopic        Molar        Multiple        Live Births   2                Past Medical History:   Diagnosis Date   • Acute reaction to stress    • Acute suppurative otitis media    • Endometriosis    • Hiatal hernia    • History of stress test     chest pain. normal   • Hyperlipidemia    • Hypertension    • Hypothyroidism    • Leiomyoma of uterus    • Lump or mass in breast      p/o biopsy    • Nephrolithiasis    • Ovarian cyst    • Palpitations      s/p holter 6/15 - per cardio - try beat blocker   • Seasonal allergies    • Vitamin D deficiency         Past Surgical History:   Procedure Laterality Date   • BREAST BIOPSY     • BREAST SURGERY      biopsy    •  SECTION       x 2 ,    • HYSTERECTOMY      With Endometrial Ablation   • KIDNEY SURGERY  2013     Lithotripsy   • OTHER SURGICAL HISTORY      Gyn Laparoscopy With Adhesiolysis Broad Ligaments. ,    • PELVIC LAPAROSCOPY     • WISDOM TOOTH EXTRACTION         Health Maintenance   Topic Date Due   • ANNUAL PHYSICAL  Never done   • TDAP/TD VACCINES (1 -  "Tdap) Never done   • ZOSTER VACCINE (1 of 2) Never done   • HEPATITIS C SCREENING  Never done   • COVID-19 Vaccine (3 - Booster for Moderna series) 09/23/2021   • Annual Gynecologic Pelvic and Breast Exam  08/20/2022   • INFLUENZA VACCINE  10/01/2022   • MAMMOGRAM  12/01/2022   • LIPID PANEL  08/16/2023   • DXA SCAN  08/19/2023   • PAP SMEAR  08/19/2024   • COLORECTAL CANCER SCREENING  06/01/2028   • Pneumococcal Vaccine 0-64  Aged Out       The additional following portions of the patient's history were reviewed and updated as appropriate: allergies, current medications, past family history, past medical history, past social history, past surgical history and problem list.    Review of Systems   Constitutional: Negative.    HENT: Negative.    Eyes: Negative.    Respiratory: Negative.    Cardiovascular: Negative.    Gastrointestinal: Negative.    Endocrine: Negative.    Genitourinary: Negative.    Musculoskeletal: Negative.    Skin: Negative.    Allergic/Immunologic: Negative.    Neurological: Negative.    Hematological: Negative.    Psychiatric/Behavioral: Negative.        I have reviewed and agree with the HPI, ROS, and historical information as entered above. Amanda Monroy MD    Objective   /80   Ht 175.3 cm (69\")   Wt 69.9 kg (154 lb)   BMI 22.74 kg/m²     Physical Exam  Vitals and nursing note reviewed. Exam conducted with a chaperone present.   Constitutional:       Appearance: She is well-developed.   HENT:      Head: Normocephalic and atraumatic.   Neck:      Thyroid: No thyroid mass or thyromegaly.   Cardiovascular:      Rate and Rhythm: Normal rate and regular rhythm.      Heart sounds: No murmur heard.  Pulmonary:      Effort: Pulmonary effort is normal. No retractions.      Breath sounds: Normal breath sounds. No wheezing, rhonchi or rales.   Chest:      Chest wall: No mass or tenderness.   Breasts:      Right: Normal. No mass, nipple discharge, skin change or tenderness.      Left: Normal. No " mass, nipple discharge, skin change or tenderness.       Abdominal:      General: Bowel sounds are normal.      Palpations: Abdomen is soft. Abdomen is not rigid. There is no mass.      Tenderness: There is no abdominal tenderness. There is no guarding.      Hernia: No hernia is present. There is no hernia in the left inguinal area.   Genitourinary:     Labia:         Right: No rash, tenderness or lesion.         Left: No rash, tenderness or lesion.       Vagina: Normal. No vaginal discharge or lesions.      Cervix: No cervical motion tenderness, discharge, lesion or cervical bleeding.      Uterus: Normal. Not enlarged, not fixed and not tender.       Adnexa:         Right: No mass or tenderness.          Left: No mass or tenderness.        Rectum: No external hemorrhoid.          Comments: Small raised area of skin of uncertain etiology on perineal body  Musculoskeletal:      Cervical back: Normal range of motion. No muscular tenderness.   Neurological:      Mental Status: She is alert and oriented to person, place, and time.   Psychiatric:         Behavior: Behavior normal.       Emla cream was placed on site to be biopsied on the perinuem.  Once adequate the cream was wiped off and betadine placed.  A Kevorkian biopsy forceps was used to biopsy the affected area.  Good hemostasis noted and patient tolerated well.       Assessment and Plan    Encounter Diagnoses   Name Primary?   • Women's annual routine gynecological examination Yes   • Encounter for breast cancer screening using non-mammogram modality    • Postmenopausal status    • Vaginal dryness    • Vulvar lesion          1. GYN annual well woman exam.   2. Reviewed monthly self breast exams.  Instructed to call with lumps, pain, or breast discharge.  Yearly mammograms ordered.  3. Ordered mammogram today.  4. Recommended use of Vitamin D and getting adequate calcium in her diet. (1500mg)  5. Osteoporosis screening ordered today for next year  6. Vulvar  lesion removed.  7. Discussed vaginal dryness.  Intrarosa did not work well for her.  Encouraged to consider vaginal estrogen.  She will try Vagifem and if not covered  Imvexxy through a specialty pharmacy  8. Return in about 1 year (around 8/25/2023), or if symptoms worsen or fail to improve.     Amanda Monroy MD  08/25/2022

## 2022-08-26 ENCOUNTER — TELEPHONE (OUTPATIENT)
Dept: OBSTETRICS AND GYNECOLOGY | Facility: CLINIC | Age: 57
End: 2022-08-26

## 2022-08-26 LAB — REF LAB TEST METHOD: NORMAL

## 2022-08-26 NOTE — TELEPHONE ENCOUNTER
Pt stated she had received her results via [facility] but had a few questions she would like to discuss with a nurse

## 2022-10-10 RX ORDER — LEVOTHYROXINE SODIUM 0.07 MG/1
TABLET ORAL
Qty: 90 TABLET | Refills: 1 | OUTPATIENT
Start: 2022-10-10

## 2022-12-02 RX ORDER — LEVOTHYROXINE SODIUM 88 UG/1
88 TABLET ORAL DAILY
Qty: 90 TABLET | Refills: 1 | Status: SHIPPED | OUTPATIENT
Start: 2022-12-02 | End: 2023-02-23

## 2023-02-22 ENCOUNTER — OFFICE VISIT (OUTPATIENT)
Dept: ENDOCRINOLOGY | Facility: CLINIC | Age: 58
End: 2023-02-22
Payer: COMMERCIAL

## 2023-02-22 VITALS
WEIGHT: 156 LBS | DIASTOLIC BLOOD PRESSURE: 68 MMHG | BODY MASS INDEX: 23.11 KG/M2 | SYSTOLIC BLOOD PRESSURE: 122 MMHG | OXYGEN SATURATION: 98 % | HEIGHT: 69 IN | HEART RATE: 82 BPM

## 2023-02-22 DIAGNOSIS — E78.2 MIXED HYPERLIPIDEMIA: Primary | ICD-10-CM

## 2023-02-22 DIAGNOSIS — E03.9 ACQUIRED HYPOTHYROIDISM: ICD-10-CM

## 2023-02-22 DIAGNOSIS — H05.012 CELLULITIS OF LEFT ORBITAL REGION: ICD-10-CM

## 2023-02-22 DIAGNOSIS — H53.9 VISION CHANGES: ICD-10-CM

## 2023-02-22 DIAGNOSIS — R79.89 ELEVATED LIVER FUNCTION TESTS: ICD-10-CM

## 2023-02-22 LAB
ALBUMIN SERPL-MCNC: 4.5 G/DL (ref 3.5–5.2)
ALBUMIN/GLOB SERPL: 1.3 G/DL
ALP SERPL-CCNC: 104 U/L (ref 39–117)
ALT SERPL W P-5'-P-CCNC: 42 U/L (ref 1–33)
ANION GAP SERPL CALCULATED.3IONS-SCNC: 14.9 MMOL/L (ref 5–15)
AST SERPL-CCNC: 26 U/L (ref 1–32)
BILIRUB SERPL-MCNC: 0.3 MG/DL (ref 0–1.2)
BUN SERPL-MCNC: 15 MG/DL (ref 6–20)
BUN/CREAT SERPL: 18.1 (ref 7–25)
CALCIUM SPEC-SCNC: 10.4 MG/DL (ref 8.6–10.5)
CHLORIDE SERPL-SCNC: 102 MMOL/L (ref 98–107)
CHOLEST SERPL-MCNC: 216 MG/DL (ref 0–200)
CO2 SERPL-SCNC: 24.1 MMOL/L (ref 22–29)
CREAT SERPL-MCNC: 0.83 MG/DL (ref 0.57–1)
EGFRCR SERPLBLD CKD-EPI 2021: 81.3 ML/MIN/1.73
GLOBULIN UR ELPH-MCNC: 3.4 GM/DL
GLUCOSE SERPL-MCNC: 88 MG/DL (ref 65–99)
HAV IGM SERPL QL IA: NORMAL
HBV CORE IGM SERPL QL IA: NORMAL
HBV SURFACE AG SERPL QL IA: NORMAL
HCV AB SER DONR QL: NORMAL
HDLC SERPL-MCNC: 66 MG/DL (ref 40–60)
LDLC SERPL CALC-MCNC: 140 MG/DL (ref 0–100)
LDLC/HDLC SERPL: 2.1 {RATIO}
POTASSIUM SERPL-SCNC: 4.4 MMOL/L (ref 3.5–5.2)
PROT SERPL-MCNC: 7.9 G/DL (ref 6–8.5)
SODIUM SERPL-SCNC: 141 MMOL/L (ref 136–145)
TRIGL SERPL-MCNC: 58 MG/DL (ref 0–150)
VLDLC SERPL-MCNC: 10 MG/DL (ref 5–40)

## 2023-02-22 PROCEDURE — 80074 ACUTE HEPATITIS PANEL: CPT | Performed by: INTERNAL MEDICINE

## 2023-02-22 PROCEDURE — 86235 NUCLEAR ANTIGEN ANTIBODY: CPT | Performed by: INTERNAL MEDICINE

## 2023-02-22 PROCEDURE — 84443 ASSAY THYROID STIM HORMONE: CPT | Performed by: INTERNAL MEDICINE

## 2023-02-22 PROCEDURE — 80061 LIPID PANEL: CPT | Performed by: INTERNAL MEDICINE

## 2023-02-22 PROCEDURE — 86038 ANTINUCLEAR ANTIBODIES: CPT | Performed by: INTERNAL MEDICINE

## 2023-02-22 PROCEDURE — 84439 ASSAY OF FREE THYROXINE: CPT | Performed by: INTERNAL MEDICINE

## 2023-02-22 PROCEDURE — 99214 OFFICE O/P EST MOD 30 MIN: CPT | Performed by: INTERNAL MEDICINE

## 2023-02-22 PROCEDURE — 86015 ACTIN ANTIBODY EACH: CPT | Performed by: INTERNAL MEDICINE

## 2023-02-22 PROCEDURE — 86381 MITOCHONDRIAL ANTIBODY EACH: CPT | Performed by: INTERNAL MEDICINE

## 2023-02-22 PROCEDURE — 80053 COMPREHEN METABOLIC PANEL: CPT | Performed by: INTERNAL MEDICINE

## 2023-02-22 RX ORDER — CIPROFLOXACIN 500 MG/1
TABLET, FILM COATED ORAL
COMMUNITY
Start: 2023-02-20

## 2023-02-22 RX ORDER — CIPROFLOXACIN HYDROCHLORIDE 3.5 MG/ML
SOLUTION/ DROPS TOPICAL
COMMUNITY
Start: 2023-02-21

## 2023-02-22 RX ORDER — CLINDAMYCIN HYDROCHLORIDE 300 MG/1
CAPSULE ORAL
COMMUNITY
Start: 2023-02-21

## 2023-02-22 NOTE — ASSESSMENT & PLAN NOTE
Improved based on pictures  Continue antibiotics  Some facial redness - trial allegra or zyrtec  F/u with PCP for worsening symptoms

## 2023-02-22 NOTE — PROGRESS NOTES
Dory Mccord 57 y.o.  CC:  Hypothyroidism (Patient is here for follow up of Hypothyroidism. She complains of PACs and heart palpitations. She is being treated for Blepharitis. Cipro, clindamycin, prednisone and cipro eye drops. )      Alutiiq: Hypothyroidism (Patient is here for follow up of Hypothyroidism. She complains of PACs and heart palpitations. She is being treated for Blepharitis. Cipro, clindamycin, prednisone and cipro eye drops. )    Left orbital cellulitus  Treated by PCP - better today   Also ER yesterday added steroid shot and clindamycin, oral steroids  bp is good   Neck and face slightly red as well  Energy is good - is taking synthroid 88 mcg daily  C/o floaters right, with bright half moon shape - lasted 45 min to 1 hour and then normal  Slight headache afterward- saw PCP and recommended eye exam  Anxiety, occ palpitations  Saw rheum- mild lft elevation    Allergies   Allergen Reactions   • Flagyl [Metronidazole] Itching   • Oxycodone-Acetaminophen Itching       Current Outpatient Medications:   •  ascorbic acid (VITAMIN C) 250 MG tablet, take 2 daily, Disp: , Rfl:   •  Azelastine-Fluticasone 137-50 MCG/ACT suspension, 1 spray into the nostril(s) as directed by provider Every 12 (Twelve) Hours., Disp: , Rfl:   •  Bacillus Coagulans-Inulin (Probiotic) 1-250 BILLION-MG capsule, once daily, Disp: , Rfl:   •  Cholecalciferol (VITAMIN D) 1000 UNITS tablet, Take  by mouth., Disp: , Rfl:   •  Cyanocobalamin ER 1000 MCG tablet controlled-release, Take  by mouth., Disp: , Rfl:   •  esomeprazole (nexIUM) 20 MG capsule, Take 20 mg by mouth., Disp: , Rfl:   •  FIBER COMPLETE PO, Take  by mouth 2 (Two) Times a Day., Disp: , Rfl:   •  hydroCHLOROthiazide (HYDRODIURIL) 25 MG tablet, , Disp: , Rfl:   •  levothyroxine (SYNTHROID, LEVOTHROID) 88 MCG tablet, TAKE 1 TABLET BY MOUTH DAILY. REPLACES 75 MCG DAILY DOSING, Disp: 90 tablet, Rfl: 1  •  LORazepam (ATIVAN) 0.5 MG tablet, Take  by mouth., Disp: , Rfl:   •   polycarbophil 625 MG tablet tablet, take 1 tablet once a day, Disp: , Rfl:   •  ciprofloxacin (CILOXAN) 0.3 % ophthalmic solution, , Disp: , Rfl:   •  ciprofloxacin (CIPRO) 500 MG tablet, , Disp: , Rfl:   •  clindamycin (CLEOCIN) 300 MG capsule, , Disp: , Rfl:   Patient Active Problem List    Diagnosis    • Elevated liver function tests [R79.89]    • Cellulitis of left orbital region [H05.012]    • Vision changes [H53.9]    • 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]    • Chronic maxillary sinusitis [J32.0]    • Diverticulitis [K57.92]    • Acute stress disorder [F43.0]    • Atrial premature depolarization [I49.1]    • Chest pain [R07.9]    • Hiatal hernia [K44.9]    • Hyperlipidemia [E78.5]    • Hypertension [I10]    • Hypothyroidism [E03.9]    • Irritable bowel syndrome [K58.9]    • Uterine leiomyoma [D25.9]    • Mass of breast [N63.0]    • Calculus of kidney [N20.0]    • Obstructive sleep apnea syndrome [G47.33]    • Cyst of ovary [N83.209]    • Palpitations [R00.2]    • Seasonal allergic rhinitis [J30.2]    • Vitamin D deficiency [E55.9]      Review of Systems   Constitutional: Negative for activity change, appetite change and unexpected weight change.   HENT: Negative for congestion and rhinorrhea.    Eyes: Positive for redness and visual disturbance.   Respiratory: Negative for cough and shortness of breath.    Cardiovascular: Negative for palpitations and leg swelling.   Gastrointestinal: Negative for constipation, diarrhea and nausea.   Genitourinary: Negative for hematuria.   Musculoskeletal: Positive for arthralgias. Negative for back pain, gait problem, joint swelling and myalgias.   Skin: Positive for color change. Negative for rash and wound.   Allergic/Immunologic: Negative for environmental allergies, food allergies and immunocompromised state.   Neurological: Negative for dizziness, weakness and light-headedness.   Psychiatric/Behavioral: Negative for confusion, decreased concentration, dysphoric mood and sleep  "disturbance. The patient is not nervous/anxious.      Social History     Socioeconomic History   • Marital status:    Tobacco Use   • Smoking status: Former     Packs/day: 0.00     Years: 0.00     Pack years: 0.00     Types: Cigarettes   • Smokeless tobacco: Never   Vaping Use   • Vaping Use: Never used   Substance and Sexual Activity   • Alcohol use: Yes     Alcohol/week: 2.0 standard drinks     Types: 2 Cans of beer per week     Comment: 4 drinks per week   • Drug use: No   • Sexual activity: Yes     Partners: Male     Birth control/protection: Post-menopausal     Family History   Problem Relation Age of Onset   • Diabetes Mother    • Heart disease Mother    • Melanoma Mother    • Kidney disease Mother    • Hypertension Mother    • Cancer Father         Lung cancer   • Cancer Maternal Grandmother         Breast cancer   • Breast cancer Maternal Grandmother    • Arthritis Other    • Breast cancer Other    • Hyperlipidemia Other    • Hypertension Other    • Lung cancer Other    • Stroke Other      /68 (BP Location: Right arm, Patient Position: Sitting, Cuff Size: Adult)   Pulse 82   Ht 175.3 cm (69.02\")   Wt 70.8 kg (156 lb)   SpO2 98%   BMI 23.03 kg/m²   Physical Exam  Vitals and nursing note reviewed.   Constitutional:       Appearance: Normal appearance. She is well-developed.   HENT:      Head: Normocephalic and atraumatic.      Nose: Nose normal.   Eyes:      General: Lids are normal.         Left eye: Discharge present.     Conjunctiva/sclera: Conjunctivae normal.      Pupils: Pupils are equal, round, and reactive to light.      Comments: Left periorbital cellulitis, conjunctival injection   Neck:      Thyroid: No thyroid mass or thyromegaly.      Vascular: No carotid bruit.      Trachea: Trachea normal. No tracheal deviation.   Cardiovascular:      Rate and Rhythm: Normal rate and regular rhythm.      Pulses: Normal pulses.      Heart sounds: Normal heart sounds. No murmur heard.    No " friction rub. No gallop.   Pulmonary:      Effort: Pulmonary effort is normal. No respiratory distress.      Breath sounds: Normal breath sounds. No wheezing.   Musculoskeletal:         General: No deformity. Normal range of motion.      Cervical back: Normal range of motion and neck supple.   Lymphadenopathy:      Cervical: No cervical adenopathy.   Skin:     General: Skin is warm and dry.      Findings: No erythema or rash.      Nails: There is no clubbing.   Neurological:      General: No focal deficit present.      Mental Status: She is alert and oriented to person, place, and time.      Cranial Nerves: No cranial nerve deficit.      Deep Tendon Reflexes: Reflexes are normal and symmetric. Reflexes normal.   Psychiatric:         Mood and Affect: Mood normal.         Speech: Speech normal.         Behavior: Behavior normal.         Thought Content: Thought content normal.         Judgment: Judgment normal.       Results for orders placed or performed in visit on 22   TISSUE EXAM, [facility] ([facility])    Specimen: Tissue   Result Value Ref Range    Reference Lab Report       [facility]  [address] [place] [address]  Phone: [phone] or [phone]  Fax: [phone]  Hakan Sin M.D., Medical Director    PATIENT NAME                           LABORATORY NO.  [id number]  GEOVANNY FLOWERS                      [id number]  [id number]                         AGE              SEX  SSN           CLIENT REF #  [facility]                         57      1965  F    [id number]   [id number]    [address]         REQUESTING M.PARK.     ATTENDING [place] [address]                TONEY RENEE  DATE COLLECTED      DATE RECEIVED      DATE REPORTED  2022    DIAGNOSIS:  VULVA, BIOPSY:  Histologic changes suggestive of early condyloma  See comment    COMMENT:  Sections demonstrate a small polypoid  "fragment of skin which  exhibits histologic features suggestive of an early condyloma.  At times, similar  histologic features can also be seen with  seborrheic keratoses and inflamed  fibroepithelial polyps.  Clinical correlation is needed.    CLINICAL HISTORY:  Vulvar lesion    SPECIMENS RECEIVED:  VULVA, BIOPSY    MICROSCOPIC DESCRIPTION:  Tissue blocks are prepared and slides are examined microscopically on all  specimens. See diagnosis for details.    Professional interpretation rendered by Lorrie Gaines M.D., ARELI at  [facility], [address] [place] [address].    GROSS DESCRIPTION:  Specimen is received in 1 formalin filled container and contains \"vulvar biopsy\"  per requisition.  Specimen consists of a single piece of tan soft tissue measuring  0.1 0.1 x 0.1 cm.  Specimen is submitted as received entirely in 1 cassette.  MM    REVIEWED, DIAGNOSED AND ELECTRONICALLY  SIGNED BY:    Lorrie Gaines M.D., NATE.MARTHA.A.P.  CPT CODES:  38629       Diagnoses and all orders for this visit:    1. Mixed hyperlipidemia (Primary)  Assessment & Plan:  Eating low fat diet   Check flp     Orders:  -     Comprehensive Metabolic Panel; Future  -     Lipid Panel; Future  -     Comprehensive Metabolic Panel  -     Lipid Panel    2. Acquired hypothyroidism  Assessment & Plan:  Is taking synthroid 88 mcg daily   Check tfts     Orders:  -     TSH; Future  -     T4, Free; Future  -     TSH  -     T4, Free    3. Elevated liver function tests  -     Anti-Smooth Muscle Antibody Titer; Future  -     Mitochondrial Antibodies, M2; Future  -     KAREN by IFA, Reflex 9-biomarkers profile; Future  -     RNP Antibodies; Future  -     Hepatitis panel, acute; Future  -     Anti-Smooth Muscle Antibody Titer  -     Mitochondrial Antibodies, M2  -     KAREN by IFA, Reflex 9-biomarkers profile  -     RNP Antibodies  -     Hepatitis panel, acute    4. Cellulitis of left orbital region  Assessment & Plan:  Improved based on " pictures  Continue antibiotics  Some facial redness - trial allegra or zyrtec  F/u with PCP for worsening symptoms       5. Vision changes  Assessment & Plan:  Right eye - floaters and flashing light- recommended ophthalmology evaluation    Return in about 6 months (around 8/22/2023) for Recheck.    Berta Loja MD  Signed Berta Loja MD

## 2023-02-23 LAB
T4 FREE SERPL-MCNC: 1.69 NG/DL (ref 0.93–1.7)
TSH SERPL DL<=0.05 MIU/L-ACNC: 0.14 UIU/ML (ref 0.27–4.2)

## 2023-02-23 RX ORDER — LEVOTHYROXINE SODIUM 0.07 MG/1
75 TABLET ORAL DAILY
Qty: 90 TABLET | Refills: 1 | Status: SHIPPED | OUTPATIENT
Start: 2023-02-23

## 2023-02-24 LAB
ENA RNP AB SER-ACNC: 2.4 AI (ref 0–0.9)
MITOCHONDRIA M2 IGG SER-ACNC: <20 UNITS (ref 0–20)
SMA IGG SER-ACNC: 5 UNITS (ref 0–19)

## 2023-02-26 LAB
ANA SER QL IF: NEGATIVE
LABORATORY COMMENT REPORT: NORMAL

## 2023-03-06 RX ORDER — LEVOTHYROXINE SODIUM 88 UG/1
88 TABLET ORAL DAILY
Qty: 90 TABLET | Refills: 1 | OUTPATIENT
Start: 2023-03-06

## 2023-08-24 RX ORDER — LEVOTHYROXINE SODIUM 0.07 MG/1
TABLET ORAL
Qty: 90 TABLET | Refills: 1 | Status: SHIPPED | OUTPATIENT
Start: 2023-08-24

## 2023-08-24 NOTE — TELEPHONE ENCOUNTER
Rx Refill Note    Requested Prescriptions     Pending Prescriptions Disp Refills    levothyroxine (SYNTHROID, LEVOTHROID) 75 MCG tablet [Pharmacy Med Name: LEVOTHYROXINE 75 MCG TABLET] 90 tablet 1     Sig: TAKE 1 TABLET BY MOUTH EVERY DAY        Last office visit with prescribing clinician: 2/22/2023        Next office visit with prescribing clinician: 8/29/2023   {

## 2023-08-29 ENCOUNTER — OFFICE VISIT (OUTPATIENT)
Dept: ENDOCRINOLOGY | Facility: CLINIC | Age: 58
End: 2023-08-29
Payer: COMMERCIAL

## 2023-08-29 VITALS
SYSTOLIC BLOOD PRESSURE: 122 MMHG | WEIGHT: 159 LBS | BODY MASS INDEX: 23.55 KG/M2 | HEART RATE: 56 BPM | DIASTOLIC BLOOD PRESSURE: 60 MMHG | OXYGEN SATURATION: 100 % | HEIGHT: 69 IN

## 2023-08-29 DIAGNOSIS — E03.9 ACQUIRED HYPOTHYROIDISM: ICD-10-CM

## 2023-08-29 DIAGNOSIS — E55.9 VITAMIN D DEFICIENCY: ICD-10-CM

## 2023-08-29 DIAGNOSIS — E78.2 MIXED HYPERLIPIDEMIA: Primary | ICD-10-CM

## 2023-08-29 DIAGNOSIS — I10 PRIMARY HYPERTENSION: ICD-10-CM

## 2023-08-29 LAB
25(OH)D3 SERPL-MCNC: 71.7 NG/ML (ref 30–100)
ALBUMIN SERPL-MCNC: 4.6 G/DL (ref 3.5–5.2)
ALBUMIN/GLOB SERPL: 2 G/DL
ALP SERPL-CCNC: 79 U/L (ref 39–117)
ALT SERPL W P-5'-P-CCNC: 26 U/L (ref 1–33)
ANION GAP SERPL CALCULATED.3IONS-SCNC: 11 MMOL/L (ref 5–15)
AST SERPL-CCNC: 22 U/L (ref 1–32)
BILIRUB SERPL-MCNC: 0.5 MG/DL (ref 0–1.2)
BUN SERPL-MCNC: 16 MG/DL (ref 8–23)
BUN/CREAT SERPL: 17.8 (ref 7–25)
CALCIUM SPEC-SCNC: 9.8 MG/DL (ref 8.6–10.5)
CHLORIDE SERPL-SCNC: 102 MMOL/L (ref 98–107)
CHOLEST SERPL-MCNC: 215 MG/DL (ref 0–200)
CO2 SERPL-SCNC: 25 MMOL/L (ref 22–29)
CREAT SERPL-MCNC: 0.9 MG/DL (ref 0.57–1)
EGFRCR SERPLBLD CKD-EPI 2021: 73.3 ML/MIN/1.73
GLOBULIN UR ELPH-MCNC: 2.3 GM/DL
GLUCOSE SERPL-MCNC: 84 MG/DL (ref 65–99)
HDLC SERPL-MCNC: 73 MG/DL (ref 40–60)
LDLC SERPL CALC-MCNC: 129 MG/DL (ref 0–100)
LDLC/HDLC SERPL: 1.75 {RATIO}
POTASSIUM SERPL-SCNC: 3.9 MMOL/L (ref 3.5–5.2)
PROT SERPL-MCNC: 6.9 G/DL (ref 6–8.5)
SODIUM SERPL-SCNC: 138 MMOL/L (ref 136–145)
T4 FREE SERPL-MCNC: 1.21 NG/DL (ref 0.93–1.7)
TRIGL SERPL-MCNC: 71 MG/DL (ref 0–150)
TSH SERPL DL<=0.05 MIU/L-ACNC: 9.14 UIU/ML (ref 0.27–4.2)
VIT B12 BLD-MCNC: >2000 PG/ML (ref 211–946)
VLDLC SERPL-MCNC: 13 MG/DL (ref 5–40)

## 2023-08-29 PROCEDURE — 82607 VITAMIN B-12: CPT | Performed by: INTERNAL MEDICINE

## 2023-08-29 PROCEDURE — 84439 ASSAY OF FREE THYROXINE: CPT | Performed by: INTERNAL MEDICINE

## 2023-08-29 PROCEDURE — 80061 LIPID PANEL: CPT | Performed by: INTERNAL MEDICINE

## 2023-08-29 PROCEDURE — 80053 COMPREHEN METABOLIC PANEL: CPT | Performed by: INTERNAL MEDICINE

## 2023-08-29 PROCEDURE — 82306 VITAMIN D 25 HYDROXY: CPT | Performed by: INTERNAL MEDICINE

## 2023-08-29 PROCEDURE — 84443 ASSAY THYROID STIM HORMONE: CPT | Performed by: INTERNAL MEDICINE

## 2023-08-29 PROCEDURE — 99214 OFFICE O/P EST MOD 30 MIN: CPT | Performed by: INTERNAL MEDICINE

## 2023-08-29 NOTE — PROGRESS NOTES
Dory Mccord 58 y.o.  CC:Follow-up and Hypothyroidism      [place]: Follow-up and Hypothyroidism    Exercising more (Pickleball)  Doing well with synthroid 75 mcg daily   Some joint pain but better overall with increased activity   Bp is good- taking hctz    Allergies   Allergen Reactions    Flagyl [Metronidazole] Itching    Oxycodone-Acetaminophen Itching       Current Outpatient Medications:     ascorbic acid (VITAMIN C) 250 MG tablet, take 2 daily, Disp: , Rfl:     Azelastine-Fluticasone 137-50 MCG/ACT suspension, 1 spray into the nostril(s) as directed by provider Every 12 (Twelve) Hours., Disp: , Rfl:     Bacillus Coagulans-Inulin (Probiotic) 1-250 BILLION-MG capsule, once daily, Disp: , Rfl:     Cholecalciferol (VITAMIN D) 1000 UNITS tablet, Take  by mouth., Disp: , Rfl:     Cyanocobalamin ER 1000 MCG tablet controlled-release, Take  by mouth., Disp: , Rfl:     esomeprazole (nexIUM) 20 MG capsule, Take 20 mg by mouth., Disp: , Rfl:     FIBER COMPLETE PO, Take  by mouth 2 (Two) Times a Day., Disp: , Rfl:     hydroCHLOROthiazide (HYDRODIURIL) 25 MG tablet, , Disp: , Rfl:     levothyroxine (SYNTHROID, LEVOTHROID) 75 MCG tablet, TAKE 1 TABLET BY MOUTH EVERY DAY, Disp: 90 tablet, Rfl: 1    LORazepam (ATIVAN) 0.5 MG tablet, Take  by mouth., Disp: , Rfl:     polycarbophil 625 MG tablet tablet, take 1 tablet once a day, Disp: , Rfl:   Patient Active Problem List    Diagnosis     Elevated liver function tests [R79.89]     Cellulitis of left orbital region [H05.012]     Vision changes [H53.9]     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]     Chronic maxillary sinusitis [J32.0]     Diverticulitis [K57.92]     Acute stress disorder [F43.0]     Atrial premature depolarization [I49.1]     Chest pain [R07.9]     Hiatal hernia [K44.9]     Hyperlipidemia [E78.5]     Hypertension [I10]     Hypothyroidism [E03.9]     Irritable bowel syndrome [K58.9]     Uterine leiomyoma [D25.9]     Mass of breast [N63.0]     Calculus of kidney [N20.0]      Obstructive sleep apnea syndrome [G47.33]     Cyst of ovary [N83.209]     Palpitations [R00.2]     Seasonal allergic rhinitis [J30.2]     Vitamin D deficiency [E55.9]      Review of Systems   Constitutional:  Negative for activity change, appetite change and unexpected weight change.   HENT:  Negative for congestion and rhinorrhea.    Eyes:  Negative for visual disturbance.   Respiratory:  Negative for cough and shortness of breath.    Cardiovascular:  Negative for palpitations and leg swelling.   Gastrointestinal:  Negative for constipation, diarrhea and nausea.   Genitourinary:  Negative for hematuria.   Musculoskeletal:  Negative for arthralgias, back pain, gait problem, joint swelling and myalgias.   Skin:  Negative for color change, rash and wound.   Allergic/Immunologic: Negative for environmental allergies, food allergies and immunocompromised state.   Neurological:  Negative for dizziness, weakness and light-headedness.   Psychiatric/Behavioral:  Negative for confusion, decreased concentration, dysphoric mood and sleep disturbance. The patient is not nervous/anxious.    Social History     Socioeconomic History    Marital status:    Tobacco Use    Smoking status: Former     Packs/day: 0.00     Years: 0.00     Pack years: 0.00     Types: Cigarettes    Smokeless tobacco: Never   Vaping Use    Vaping Use: Never used   Substance and Sexual Activity    Alcohol use: Yes     Alcohol/week: 2.0 standard drinks     Types: 2 Cans of beer per week     Comment: 4 drinks per week    Drug use: No    Sexual activity: Yes     Partners: Male     Birth control/protection: Post-menopausal     Family History   Problem Relation Age of Onset    Diabetes Mother     Heart disease Mother     Melanoma Mother     Kidney disease Mother     Hypertension Mother     Cancer Father         Lung cancer    Cancer Maternal Grandmother         Breast cancer    Breast cancer Maternal Grandmother     Arthritis Other     Breast cancer Other   "   Hyperlipidemia Other     Hypertension Other     Lung cancer Other     Stroke Other      /60   Pulse 56   Ht 175.3 cm (69\")   Wt 72.1 kg (159 lb)   SpO2 100%   BMI 23.48 kg/mý   Physical Exam  Vitals and nursing note reviewed.   Constitutional:       Appearance: Normal appearance. She is well-developed.   HENT:      Head: Normocephalic and atraumatic.   Eyes:      General: Lids are normal.      Extraocular Movements: Extraocular movements intact.      Conjunctiva/sclera: Conjunctivae normal.      Pupils: Pupils are equal, round, and reactive to light.   Neck:      Thyroid: No thyroid mass or thyromegaly.      Vascular: No carotid bruit.      Trachea: Trachea normal. No tracheal deviation.   Cardiovascular:      Rate and Rhythm: Normal rate and regular rhythm.      Pulses: Normal pulses.      Heart sounds: Normal heart sounds. No murmur heard.    No friction rub. No gallop.   Pulmonary:      Effort: Pulmonary effort is normal. No respiratory distress.      Breath sounds: Normal breath sounds. No wheezing.   Musculoskeletal:         General: No deformity. Normal range of motion.      Cervical back: Normal range of motion and neck supple.   Lymphadenopathy:      Cervical: No cervical adenopathy.   Skin:     General: Skin is warm and dry.      Findings: No erythema or rash.      Nails: There is no clubbing.   Neurological:      General: No focal deficit present.      Mental Status: She is alert and oriented to person, place, and time.      Cranial Nerves: No cranial nerve deficit.      Deep Tendon Reflexes: Reflexes are normal and symmetric. Reflexes normal.   Psychiatric:         Mood and Affect: Mood normal.         Speech: Speech normal.         Behavior: Behavior normal.         Thought Content: Thought content normal.         Judgment: Judgment normal.     Results for orders placed or performed in visit on 02/22/23   Comprehensive Metabolic Panel    Specimen: Arm, Left; Blood   Result Value Ref Range "    Glucose 88 65 - 99 mg/dL    BUN 15 6 - 20 mg/dL    Creatinine 0.83 0.57 - 1.00 mg/dL    Sodium 141 136 - 145 mmol/L    Potassium 4.4 3.5 - 5.2 mmol/L    Chloride 102 98 - 107 mmol/L    CO2 24.1 22.0 - 29.0 mmol/L    Calcium 10.4 8.6 - 10.5 mg/dL    Total Protein 7.9 6.0 - 8.5 g/dL    Albumin 4.5 3.5 - 5.2 g/dL    ALT (SGPT) 42 (H) 1 - 33 U/L    AST (SGOT) 26 1 - 32 U/L    Alkaline Phosphatase 104 39 - 117 U/L    Total Bilirubin 0.3 0.0 - 1.2 mg/dL    Globulin 3.4 gm/dL    A/G Ratio 1.3 g/dL    BUN/Creatinine Ratio 18.1 7.0 - 25.0    Anion Gap 14.9 5.0 - 15.0 mmol/L    eGFR 81.3 >60.0 mL/min/1.73   Lipid Panel    Specimen: Arm, Left; Blood   Result Value Ref Range    Total Cholesterol 216 (H) 0 - 200 mg/dL    Triglycerides 58 0 - 150 mg/dL    HDL Cholesterol 66 (H) 40 - 60 mg/dL    LDL Cholesterol  140 (H) 0 - 100 mg/dL    VLDL Cholesterol 10 5 - 40 mg/dL    LDL/HDL Ratio 2.10    TSH    Specimen: Arm, Left; Blood   Result Value Ref Range    TSH 0.145 (L) 0.270 - 4.200 uIU/mL   T4, Free    Specimen: Arm, Left; Blood   Result Value Ref Range    Free T4 1.69 0.93 - 1.70 ng/dL   Anti-Smooth Muscle Antibody Titer    Specimen: Arm, Left; Blood   Result Value Ref Range    Smooth Muscle Ab 5 0 - 19 Units   Mitochondrial Antibodies, M2    Specimen: Arm, Left; Blood   Result Value Ref Range    Mitochondrial Ab <20.0 0.0 - 20.0 Units   KAREN by IFA, Reflex 9-biomarkers profile    Specimen: Arm, Left; Blood   Result Value Ref Range    KAREN Negative     Please note Comment    RNP Antibodies    Specimen: Arm, Left; Blood   Result Value Ref Range    RNP Antibodies 2.4 (H) 0.0 - 0.9 AI   Hepatitis panel, acute    Specimen: Arm, Left; Blood   Result Value Ref Range    Hepatitis B Surface Ag Non-Reactive Non-Reactive    Hep A IgM Non-Reactive Non-Reactive    Hep B C IgM Non-Reactive Non-Reactive    Hepatitis C Ab Non-Reactive Non-Reactive     Diagnoses and all orders for this visit:    1. Mixed hyperlipidemia (Primary)  Assessment &  Plan:  Check flp   Eating low fat diet    Orders:  -     Comprehensive Metabolic Panel; Future  -     Lipid Panel; Future    2. Acquired hypothyroidism  Assessment & Plan:  Is taking synthroid 75 mcg daily   Check tfts     Orders:  -     TSH; Future  -     T4, Free; Future    3. Primary hypertension  Assessment & Plan:  Bp is normal   Check cmp taking hctz       Return in about 6 months (around 2/29/2024) for Recheck.    Berta Loja MD  Signed Berta Loja MD

## 2023-08-30 RX ORDER — LEVOTHYROXINE SODIUM 88 UG/1
88 TABLET ORAL DAILY
Qty: 90 TABLET | Refills: 1 | Status: SHIPPED | OUTPATIENT
Start: 2023-08-30

## 2023-08-31 ENCOUNTER — OFFICE VISIT (OUTPATIENT)
Dept: OBSTETRICS AND GYNECOLOGY | Facility: CLINIC | Age: 58
End: 2023-08-31
Payer: COMMERCIAL

## 2023-08-31 VITALS
SYSTOLIC BLOOD PRESSURE: 112 MMHG | BODY MASS INDEX: 23.25 KG/M2 | WEIGHT: 157 LBS | HEIGHT: 69 IN | DIASTOLIC BLOOD PRESSURE: 74 MMHG

## 2023-08-31 DIAGNOSIS — Z13.820 ENCOUNTER FOR SCREENING FOR OSTEOPOROSIS: ICD-10-CM

## 2023-08-31 DIAGNOSIS — N32.89 BLADDER SPASMS: Primary | ICD-10-CM

## 2023-08-31 DIAGNOSIS — N95.0 PMB (POSTMENOPAUSAL BLEEDING): ICD-10-CM

## 2023-08-31 DIAGNOSIS — N89.8 VAGINAL DISCHARGE: ICD-10-CM

## 2023-08-31 DIAGNOSIS — R31.9 HEMATURIA, UNSPECIFIED TYPE: ICD-10-CM

## 2023-08-31 DIAGNOSIS — Z01.419 WOMEN'S ANNUAL ROUTINE GYNECOLOGICAL EXAMINATION: ICD-10-CM

## 2023-08-31 LAB
BILIRUB BLD-MCNC: NEGATIVE MG/DL
CLARITY, POC: CLEAR
COLOR UR: YELLOW
GLUCOSE UR STRIP-MCNC: NEGATIVE MG/DL
KETONES UR QL: NEGATIVE
LEUKOCYTE EST, POC: NEGATIVE
NITRITE UR-MCNC: NEGATIVE MG/ML
PH UR: 6 [PH] (ref 5–8)
PROT UR STRIP-MCNC: ABNORMAL MG/DL
RBC # UR STRIP: ABNORMAL /UL
SP GR UR: 1.02 (ref 1–1.03)
UROBILINOGEN UR QL: ABNORMAL

## 2023-08-31 NOTE — PROGRESS NOTES
Gynecologic Annual Exam Note        GYN Annual Exam     CC - Here for annual exam.        HPI  Dory Mccord is a 58 y.o. female, , who presents for annual well woman exam as a established patient.  She has had an ablation. Denies vaginal bleeding.  Patient reports problems with:  bladder/vaginal spasms that began several weeks ago- she denies any other urinary symptoms, and bump near rectum . There were no changes to her medical or surgical history since her last visit.. Marital Status: .  She is sexually active. She has not had new partners.. STD testing recommendations have been explained to the patient and she does not desire STD testing.  Has a history of kidney stones.   Would like a pap today.     Additional OB/GYN History   On HRT? No    Last Pap : 2021. Results: negative. HPV: negative.   Last Completed Pap Smear            Ordered - PAP SMEAR (Every 3 Years) Ordered on 2021  SCANNED - PAP SMEAR    2018  Done - neg, neg HPV                  History of abnormal Pap smear: no  Family history of uterine, colon, breast, or ovarian cancer: yes - MG- breast cancer  Performs monthly Self-Breast Exam: no  Last mammogram: 2023 at [facility]. Her PCP does her orders.     Last Completed Mammogram       This patient has no relevant Health Maintenance data.          Last colonoscopy: has had a colonoscopy 5 year(s) ago.    Last Completed Colonoscopy            COLORECTAL CANCER SCREENING (COLONOSCOPY - Every 10 Years) Next due on 2018  COLONOSCOPY (Done)                      Last bone density scan (DEXA): On 2021 and results were Osteoporosis  Exercises Regularly: yes  Feelings of Anxiety or Depression: no      Tobacco Usage?: No       Current Outpatient Medications:     ascorbic acid (VITAMIN C) 250 MG tablet, take 2 daily, Disp: , Rfl:     Azelastine-Fluticasone 137-50 MCG/ACT suspension, 1 spray into the nostril(s) as  directed by provider Every 12 (Twelve) Hours., Disp: , Rfl:     Bacillus Coagulans-Inulin (Probiotic) 1-250 BILLION-MG capsule, once daily, Disp: , Rfl:     Cyanocobalamin ER 1000 MCG tablet controlled-release, Take  by mouth., Disp: , Rfl:     esomeprazole (nexIUM) 20 MG capsule, Take 1 capsule by mouth., Disp: , Rfl:     FIBER COMPLETE PO, Take  by mouth 2 (Two) Times a Day., Disp: , Rfl:     hydroCHLOROthiazide (HYDRODIURIL) 25 MG tablet, , Disp: , Rfl:     levothyroxine (SYNTHROID, LEVOTHROID) 88 MCG tablet, Take 1 tablet by mouth Daily. Replaces 75 mcg daily dose, Disp: 90 tablet, Rfl: 1    LORazepam (ATIVAN) 0.5 MG tablet, Take  by mouth., Disp: , Rfl:     polycarbophil 625 MG tablet tablet, take 1 tablet once a day, Disp: , Rfl:     Patient denies the need for medication refills today.    OB History          2    Para   2    Term                AB        Living   2         SAB        IAB        Ectopic        Molar        Multiple        Live Births   2                Past Medical History:   Diagnosis Date    Acute reaction to stress     Acute suppurative otitis media     Endometriosis     Hiatal hernia     History of stress test     chest pain. normal    Hyperlipidemia 2020    Hypertension     Hypothyroidism     Leiomyoma of uterus     Lump or mass in breast      p/o biopsy     Nephrolithiasis     Ovarian cyst     Palpitations      s/p holter 6/15 - per cardio - try beat blocker    Seasonal allergies     Vitamin D deficiency         Past Surgical History:   Procedure Laterality Date    BREAST BIOPSY      BREAST SURGERY      biopsy      SECTION       x 2 ,     HYSTERECTOMY      With Endometrial Ablation    KIDNEY SURGERY  2013     Lithotripsy    OTHER SURGICAL HISTORY      Gyn Laparoscopy With Adhesiolysis Broad Ligaments. ,     PELVIC LAPAROSCOPY      WISDOM TOOTH EXTRACTION  1985       Health Maintenance   Topic Date Due    TDAP/TD VACCINES (1 - Tdap)  "Never done    ZOSTER VACCINE (1 of 2) Never done    ANNUAL PHYSICAL  Never done    COVID-19 Vaccine (3 - Moderna series) 06/18/2021    MAMMOGRAM  12/01/2022    DXA SCAN  08/19/2023    Annual Gynecologic Pelvic and Breast Exam  08/26/2023    INFLUENZA VACCINE  10/01/2023    PAP SMEAR  08/19/2024    LIPID PANEL  08/29/2024    COLORECTAL CANCER SCREENING  06/01/2028    HEPATITIS C SCREENING  Completed    Pneumococcal Vaccine 0-64  Aged Out       The additional following portions of the patient's history were reviewed and updated as appropriate: allergies, current medications, past family history, past medical history, past social history, and past surgical history.    Review of Systems   Constitutional: Negative.    Cardiovascular: Negative.    Gastrointestinal: Negative.    Genitourinary:  Positive for dysuria and vaginal pain.   Psychiatric/Behavioral: Negative.       I have reviewed and agree with the HPI, ROS, and historical information as entered above. Maggie Quintero, APRN      Objective   /74   Ht 175.3 cm (69\")   Wt 71.2 kg (157 lb)   BMI 23.18 kg/mý     Physical Exam  Vitals and nursing note reviewed. Exam conducted with a chaperone present.   Constitutional:       General: She is not in acute distress.     Appearance: Normal appearance. She is well-developed and normal weight. She is not ill-appearing.   Neck:      Thyroid: No thyroid mass or thyromegaly.   Pulmonary:      Effort: Pulmonary effort is normal. No respiratory distress or retractions.   Chest:      Chest wall: No mass.   Breasts:     Right: Normal. No mass, nipple discharge, skin change or tenderness.      Left: Normal. No mass, nipple discharge, skin change or tenderness.   Abdominal:      General: There is no distension.      Palpations: Abdomen is soft. Abdomen is not rigid. There is no mass.      Tenderness: There is no abdominal tenderness. There is no guarding or rebound.      Hernia: No hernia is present.   Genitourinary:     " General: Normal vulva.      Exam position: Lithotomy position.      Labia:         Right: No rash, tenderness or lesion.         Left: No rash, tenderness or lesion.       Vagina: Vaginal discharge present. No erythema or lesions.      Cervix: Friability present.      Uterus: Normal. Not enlarged, not fixed and not tender.       Adnexa: Right adnexa normal and left adnexa normal.        Right: No mass or tenderness.          Left: No mass or tenderness.        Rectum: Normal. No external hemorrhoid.      Comments: Moderate amount of greenish/brownish discharge in vaginal vault, unable to tell if bloody or purulent, culture obtained.   Musculoskeletal:      Cervical back: No muscular tenderness.   Skin:     General: Skin is warm and dry.   Neurological:      Mental Status: She is alert and oriented to person, place, and time.   Psychiatric:         Mood and Affect: Mood normal.         Behavior: Behavior normal.        Assessment and Plan    Problem List Items Addressed This Visit    None  Visit Diagnoses       Bladder spasms    -  Primary    Relevant Orders    POC Urinalysis Dipstick (Completed)    Hematuria, unspecified type        Relevant Orders    Urine Culture - Urine, Urine, Clean Catch    PMB (postmenopausal bleeding)        Relevant Orders    US Non-ob Transvaginal    Encounter for screening for osteoporosis        Relevant Orders    DEXA Assess Fracture Vertebral    Women's annual routine gynecological examination        Relevant Orders    LIQUID-BASED PAP SMEAR WITH HPV GENOTYPING REGARDLESS OF INTERPRETATION (PATRICE,COR,MAD)    Vaginal discharge        Relevant Orders    Genital Mycoplasmas GABRIEL, Swab - Swab, Endocervix, Vagina            GYN annual well woman exam. Obtained pap today per pt preference.  Checking BV/Yeast/aerobic vaginitis due to discharge.  Obtain TVUS due to possible PMB.  Urine culture sent due to blood in urine.   Reviewed monthly self breast exams.  Instructed to call with lumps, pain,  or breast discharge.    Recommended use of Vitamin D and getting adequate calcium in her diet. (1500mg)  Osteoporosis screening ordered today.  Return in about 4 weeks (around 9/28/2023) for ultrasound.     Maggie Quintero, MANASA  08/31/2023

## 2023-09-02 LAB
BACTERIA UR CULT: NORMAL
BACTERIA UR CULT: NORMAL

## 2023-09-05 LAB — REF LAB TEST METHOD: NORMAL

## 2023-09-07 LAB
CONV .: NORMAL
Lab: NORMAL
SPECIMEN STATUS: NORMAL

## 2023-09-08 DIAGNOSIS — N76.0 ACUTE VAGINITIS: Primary | ICD-10-CM

## 2023-09-08 RX ORDER — FLUCONAZOLE 150 MG/1
TABLET ORAL
Qty: 2 TABLET | Refills: 1 | Status: SHIPPED | OUTPATIENT
Start: 2023-09-08

## 2023-09-08 RX ORDER — CLINDAMYCIN HYDROCHLORIDE 300 MG/1
300 CAPSULE ORAL 3 TIMES DAILY
Qty: 21 CAPSULE | Refills: 0 | Status: SHIPPED | OUTPATIENT
Start: 2023-09-08 | End: 2023-09-15

## 2023-10-03 ENCOUNTER — OFFICE VISIT (OUTPATIENT)
Dept: OBSTETRICS AND GYNECOLOGY | Facility: CLINIC | Age: 58
End: 2023-10-03
Payer: COMMERCIAL

## 2023-10-03 VITALS
DIASTOLIC BLOOD PRESSURE: 82 MMHG | HEIGHT: 69 IN | BODY MASS INDEX: 23.7 KG/M2 | WEIGHT: 160 LBS | SYSTOLIC BLOOD PRESSURE: 132 MMHG

## 2023-10-03 DIAGNOSIS — N95.0 PMB (POSTMENOPAUSAL BLEEDING): ICD-10-CM

## 2023-10-03 DIAGNOSIS — Z78.0 POSTMENOPAUSAL STATUS: Primary | ICD-10-CM

## 2023-10-03 DIAGNOSIS — K62.9 ANAL LESION: ICD-10-CM

## 2023-10-03 DIAGNOSIS — M81.0 OSTEOPOROSIS, UNSPECIFIED OSTEOPOROSIS TYPE, UNSPECIFIED PATHOLOGICAL FRACTURE PRESENCE: ICD-10-CM

## 2023-10-03 DIAGNOSIS — Z76.89 ENCOUNTER FOR BIOPSY: Primary | ICD-10-CM

## 2023-10-03 NOTE — PROGRESS NOTES
"      Chief Complaint   Patient presents with    Follow-up            HPI  Dory Mccord is a 58 y.o. female, , who presents for initial evaluation of postmenopausal bleeding.      Her last LMP was 13 years ago.   Patient seen by MANASA Pemberton on 2023 for annual exam. Patient states that MANASA Pemberton noticed vaginal bleeding on the vaginal vault when touched with a Q-Tip. Patient was treated for BV and yeast at that time. Patient denies any vaginal bleeding prior to and after her annual exam with MANASA Pemberton. Patient reports having vaginal spasms. Patient states that her vaginal spasms are \"random\" and occur a couple of times per week. Patient states that her vaginal spasms began a couple of week prior to her annual exam. Patient states that these spasms are \"noticeable\", but not painful. Patient denies vaginal estrogens, HRT, steroid use, blood thinner use, recent COVID vaccine, and recent COVID infection. The patient reports additional symptoms as none.     Did the patient have u/s today? Yes    DEXA scan today.    Additional OB/GYN History   Last Pap:   Last Completed Pap Smear            PAP SMEAR (Every 3 Years) Next due on 2023  LIQUID-BASED PAP SMEAR WITH HPV GENOTYPING REGARDLESS OF INTERPRETATION (PATRICE,COR,MAD)    2021  SCANNED - PAP SMEAR    2018  Done - neg, neg HPV                      The additional following portions of the patient's history were reviewed and updated as appropriate: allergies and current medications.    Review of Systems  All other systems reviewed and are negative.     I have reviewed and agree with the HPI, ROS, and historical information as entered above. Amanda Monroy MD      Objective   /82   Ht 175.3 cm (69\")   Wt 72.6 kg (160 lb)   LMP 10/03/2010 (Within Months)   BMI 23.63 kg/m²     Physical Exam  Constitutional:       Appearance: She is well-developed.   HENT:      Head: Normocephalic.   Eyes:      " Conjunctiva/sclera: Conjunctivae normal.   Pulmonary:      Effort: Pulmonary effort is normal.   Genitourinary:         Comments: Skin tag vs wart 12 in anus.    Area injected with 1% lidocaine after betadine cleanse.  Area removed and silver nitrate applied.  Good hemostasis and patient tolerated well.  Psychiatric:         Behavior: Behavior normal.          Assessment and Plan    t  Spotting with pap- u/s normal.  Endometrial lining thin.  Biopsy of skin lesion around anus.  Will call with biopsy results.  Call the office in 5 business days for biopsy results.  Patient instructed to call the office if develops a fever of 100.4 or greater, vaginal bleeding heavier than a period, foul vaginal discharge or pain.    Amanda Monroy MD  10/03/2023

## 2023-10-05 LAB — REF LAB TEST METHOD: NORMAL

## 2024-02-27 ENCOUNTER — OFFICE VISIT (OUTPATIENT)
Dept: ENDOCRINOLOGY | Facility: CLINIC | Age: 59
End: 2024-02-27
Payer: COMMERCIAL

## 2024-02-27 VITALS
DIASTOLIC BLOOD PRESSURE: 70 MMHG | SYSTOLIC BLOOD PRESSURE: 118 MMHG | HEIGHT: 69 IN | WEIGHT: 154.6 LBS | HEART RATE: 77 BPM | BODY MASS INDEX: 22.9 KG/M2 | OXYGEN SATURATION: 98 %

## 2024-02-27 DIAGNOSIS — I10 PRIMARY HYPERTENSION: ICD-10-CM

## 2024-02-27 DIAGNOSIS — E03.9 ACQUIRED HYPOTHYROIDISM: Primary | ICD-10-CM

## 2024-02-27 DIAGNOSIS — E78.2 MIXED HYPERLIPIDEMIA: ICD-10-CM

## 2024-02-27 LAB
25(OH)D3 SERPL-MCNC: 75.5 NG/ML (ref 30–100)
ALBUMIN SERPL-MCNC: 4.3 G/DL (ref 3.5–5.2)
ALBUMIN/GLOB SERPL: 1.7 G/DL
ALP SERPL-CCNC: 57 U/L (ref 39–117)
ALT SERPL W P-5'-P-CCNC: 19 U/L (ref 1–33)
ANION GAP SERPL CALCULATED.3IONS-SCNC: 11.5 MMOL/L (ref 5–15)
AST SERPL-CCNC: 23 U/L (ref 1–32)
BILIRUB SERPL-MCNC: 0.5 MG/DL (ref 0–1.2)
BUN SERPL-MCNC: 12 MG/DL (ref 8–23)
BUN/CREAT SERPL: 15.4 (ref 7–25)
CALCIUM SPEC-SCNC: 10.2 MG/DL (ref 8.6–10.5)
CHLORIDE SERPL-SCNC: 103 MMOL/L (ref 98–107)
CHOLEST SERPL-MCNC: 203 MG/DL (ref 0–200)
CO2 SERPL-SCNC: 26.5 MMOL/L (ref 22–29)
CREAT SERPL-MCNC: 0.78 MG/DL (ref 0.57–1)
EGFRCR SERPLBLD CKD-EPI 2021: 87.1 ML/MIN/1.73
GLOBULIN UR ELPH-MCNC: 2.6 GM/DL
GLUCOSE SERPL-MCNC: 89 MG/DL (ref 65–99)
HDLC SERPL-MCNC: 70 MG/DL (ref 40–60)
LDLC SERPL CALC-MCNC: 121 MG/DL (ref 0–100)
LDLC/HDLC SERPL: 1.71 {RATIO}
POTASSIUM SERPL-SCNC: 4.5 MMOL/L (ref 3.5–5.2)
PROT SERPL-MCNC: 6.9 G/DL (ref 6–8.5)
SODIUM SERPL-SCNC: 141 MMOL/L (ref 136–145)
T4 FREE SERPL-MCNC: 1.93 NG/DL (ref 0.93–1.7)
TRIGL SERPL-MCNC: 67 MG/DL (ref 0–150)
TSH SERPL DL<=0.05 MIU/L-ACNC: 0.07 UIU/ML (ref 0.27–4.2)
VIT B12 BLD-MCNC: 1309 PG/ML (ref 211–946)
VLDLC SERPL-MCNC: 12 MG/DL (ref 5–40)

## 2024-02-27 PROCEDURE — 82607 VITAMIN B-12: CPT | Performed by: INTERNAL MEDICINE

## 2024-02-27 PROCEDURE — 80053 COMPREHEN METABOLIC PANEL: CPT | Performed by: INTERNAL MEDICINE

## 2024-02-27 PROCEDURE — 99214 OFFICE O/P EST MOD 30 MIN: CPT | Performed by: INTERNAL MEDICINE

## 2024-02-27 PROCEDURE — 84443 ASSAY THYROID STIM HORMONE: CPT | Performed by: INTERNAL MEDICINE

## 2024-02-27 PROCEDURE — 84439 ASSAY OF FREE THYROXINE: CPT | Performed by: INTERNAL MEDICINE

## 2024-02-27 PROCEDURE — 80061 LIPID PANEL: CPT | Performed by: INTERNAL MEDICINE

## 2024-02-27 PROCEDURE — 82306 VITAMIN D 25 HYDROXY: CPT | Performed by: INTERNAL MEDICINE

## 2024-02-27 RX ORDER — LEVOTHYROXINE SODIUM 88 UG/1
88 TABLET ORAL DAILY
Qty: 90 TABLET | Refills: 1 | Status: SHIPPED | OUTPATIENT
Start: 2024-02-27 | End: 2024-02-29

## 2024-02-27 RX ORDER — MELATONIN
2000 DAILY
COMMUNITY

## 2024-02-27 NOTE — TELEPHONE ENCOUNTER
Rx Refill Note    Requested Prescriptions     Pending Prescriptions Disp Refills    levothyroxine (SYNTHROID, LEVOTHROID) 88 MCG tablet [Pharmacy Med Name: LEVOTHYROXINE 88 MCG TABLET] 90 tablet 1     Sig: TAKE 1 TABLET BY MOUTH DAILY. REPLACES 75 MCG DAILY DOSE        Last office visit with prescribing clinician: 8/29/2023     Next office visit with prescribing clinician: 2/27/2024   {

## 2024-02-27 NOTE — PROGRESS NOTES
Dory Mccord 58 y.o.  CC:Follow-up and Hypothyroidism    Douglas: Follow-up and Hypothyroidism    Working as  - bp is good  Energy is good overall - taking levothyroxine 88 mcg daily   Interim colds, stomach bugs  Is taking B12 supplement   Bp is good     Allergies   Allergen Reactions    Flagyl [Metronidazole] Itching    Oxycodone-Acetaminophen Itching       Current Outpatient Medications:     ascorbic acid (VITAMIN C) 250 MG tablet, take 2 daily, Disp: , Rfl:     Azelastine-Fluticasone 137-50 MCG/ACT suspension, 1 spray into the nostril(s) as directed by provider Every 12 (Twelve) Hours., Disp: , Rfl:     Bacillus Coagulans-Inulin (Probiotic) 1-250 BILLION-MG capsule, once daily, Disp: , Rfl:     Cholecalciferol 25 MCG (1000 UT) tablet, Take 2 tablets by mouth Daily., Disp: , Rfl:     Cyanocobalamin ER 1000 MCG tablet controlled-release, Take 500 mg by mouth Every Other Day., Disp: , Rfl:     esomeprazole (nexIUM) 20 MG capsule, Take 1 capsule by mouth., Disp: , Rfl:     FIBER COMPLETE PO, Take  by mouth 2 (Two) Times a Day., Disp: , Rfl:     hydroCHLOROthiazide (HYDRODIURIL) 25 MG tablet, , Disp: , Rfl:     levothyroxine (SYNTHROID, LEVOTHROID) 88 MCG tablet, TAKE 1 TABLET BY MOUTH DAILY. REPLACES 75 MCG DAILY DOSE, Disp: 90 tablet, Rfl: 1    LORazepam (ATIVAN) 0.5 MG tablet, Take  by mouth., Disp: , Rfl:     Patient Active Problem List    Diagnosis     Elevated liver function tests [R79.89]     Cellulitis of left orbital region [H05.012]     Vision changes [H53.9]     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]     Chronic maxillary sinusitis [J32.0]     Diverticulitis [K57.92]     Acute stress disorder [F43.0]     Atrial premature depolarization [I49.1]     Chest pain [R07.9]     Hiatal hernia [K44.9]     Hyperlipidemia [E78.5]     Hypertension [I10]     Hypothyroidism [E03.9]     Irritable bowel syndrome [K58.9]     Uterine leiomyoma [D25.9]     Mass of breast [N63.0]     Calculus of kidney [N20.0]      Obstructive sleep apnea syndrome [G47.33]     Cyst of ovary [N83.209]     Palpitations [R00.2]     Seasonal allergic rhinitis [J30.2]     Vitamin D deficiency [E55.9]      Review of Systems   Constitutional:  Negative for activity change, appetite change and unexpected weight change.   HENT:  Negative for congestion and rhinorrhea.    Eyes:  Negative for visual disturbance.   Respiratory:  Negative for cough and shortness of breath.    Cardiovascular:  Negative for palpitations and leg swelling.   Gastrointestinal:  Negative for constipation, diarrhea and nausea.   Genitourinary:  Negative for hematuria.   Musculoskeletal:  Negative for arthralgias, back pain, gait problem, joint swelling and myalgias.   Skin:  Negative for color change, rash and wound.   Allergic/Immunologic: Negative for environmental allergies, food allergies and immunocompromised state.   Neurological:  Negative for dizziness, weakness and light-headedness.   Psychiatric/Behavioral:  Negative for confusion, decreased concentration, dysphoric mood and sleep disturbance. The patient is not nervous/anxious.      Social History     Socioeconomic History    Marital status:    Tobacco Use    Smoking status: Former     Packs/day: 0.00     Years: 0.00     Additional pack years: 0.00     Total pack years: 0.00     Types: Cigarettes    Smokeless tobacco: Never   Vaping Use    Vaping Use: Never used   Substance and Sexual Activity    Alcohol use: Yes     Alcohol/week: 2.0 standard drinks of alcohol     Types: 2 Cans of beer per week     Comment: 4 drinks per week    Drug use: No    Sexual activity: Yes     Partners: Male     Birth control/protection: Post-menopausal     Family History   Problem Relation Age of Onset    Diabetes Mother     Heart disease Mother     Melanoma Mother     Kidney disease Mother     Hypertension Mother     Cancer Father         Lung cancer    Cancer Maternal Grandmother         Breast cancer    Breast cancer Maternal  "Grandmother     Arthritis Other     Breast cancer Other     Hyperlipidemia Other     Hypertension Other     Lung cancer Other     Stroke Other      /70   Pulse 77   Ht 175.3 cm (69\")   Wt 70.1 kg (154 lb 9.6 oz)   SpO2 98%   BMI 22.83 kg/m²     Physical Exam  Vitals and nursing note reviewed.   Constitutional:       Appearance: Normal appearance. She is well-developed.   HENT:      Head: Normocephalic and atraumatic.   Eyes:      General: Lids are normal.      Extraocular Movements: Extraocular movements intact.      Conjunctiva/sclera: Conjunctivae normal.      Pupils: Pupils are equal, round, and reactive to light.   Neck:      Thyroid: No thyroid mass or thyromegaly.      Vascular: No carotid bruit.      Trachea: Trachea normal. No tracheal deviation.   Cardiovascular:      Rate and Rhythm: Normal rate and regular rhythm.      Pulses: Normal pulses.      Heart sounds: Normal heart sounds. No murmur heard.     No friction rub. No gallop.   Pulmonary:      Effort: Pulmonary effort is normal. No respiratory distress.      Breath sounds: Normal breath sounds. No wheezing.   Musculoskeletal:         General: No deformity. Normal range of motion.      Cervical back: Normal range of motion and neck supple.   Lymphadenopathy:      Cervical: No cervical adenopathy.   Skin:     General: Skin is warm and dry.      Findings: No erythema or rash.      Nails: There is no clubbing.   Neurological:      General: No focal deficit present.      Mental Status: She is alert and oriented to person, place, and time.      Cranial Nerves: No cranial nerve deficit.      Deep Tendon Reflexes: Reflexes are normal and symmetric. Reflexes normal.   Psychiatric:         Mood and Affect: Mood normal.         Speech: Speech normal.         Behavior: Behavior normal.         Thought Content: Thought content normal.         Judgment: Judgment normal.       Results for orders placed or performed in visit on 10/03/23   TISSUE EXAM, P&C " "LABS (PATRICE,COR,MAD)    Specimen: Tissue   Result Value Ref Range    Reference Lab Report       [facility]  [address] [place] [address]  Phone: [phone] or [phone]  Fax: [phone]  Hakan Sin M.D., Medical Director    PATIENT NAME                           LABORATORY NO.  [id number]  GEOVANNY FLOWERS                      [id number]  [id number]                         AGE              SEX  SSN           CLIENT REF #  [facility]                         58      1965  F    [id number]   [id number]    [address] [place] [address] [address]         REQUESTING MGaryD.     ATTENDING [place] [address]                TONEY RENEE  DATE COLLECTED      DATE RECEIVED      DATE REPORTED  10/03/2023          10/03/2023         10/05/2023    DIAGNOSIS:  SOFT TISSUE, BUTTOCK:  Chronic inflammation with reactive epithelial changes  Negative for dysplasia or malignancy    COMMENT:  In some areas, the architectural features might suggest the  possibility of an inflamed fibroepithelial polyp.  Clinical correlation is needed.    CLINICAL  HISTORY:  Left buttock lesion    SPECIMENS RECEIVED:  SOFT TISSUE, BUTTOCK    MICROSCOPIC DESCRIPTION:  Tissue blocks are prepared and slides are examined microscopically on all  specimens. See diagnosis for details.    Professional interpretation rendered by Hakan Sin M.D., F.C.A.P. at  [facility], [address] [place] [address], [place] [address].    GROSS DESCRIPTION:  Labeled as \"biopsy\", consisting of 3 pieces of tan skin/soft tissue that range in  size from 0.2 x 0.2 x 0.2 cm to 0.5 x 0.4 x 0.4 cm.  The 2 smallest pieces are  submitted by themselves in A1.  The largest piece's surgical margin is inked  blue, bisected and submitted entirely in A2.  SOG    REVIEWED, DIAGNOSED AND ELECTRONICALLY  SIGNED BY:    Hakan Sin M.D., F.C.A.P.  CPT CODES:  04280       Diagnoses and all orders for this visit:    1. Acquired hypothyroidism " (Primary)  Assessment & Plan:  Taking levothyroxine 88 mcg daily   Check tfts     Orders:  -     Vitamin B12; Future  -     TSH; Future  -     T4, Free; Future  -     Vitamin B12  -     TSH  -     T4, Free    2. Primary hypertension  Assessment & Plan:  Taking hctz   Good bp control  Check cmp       3. Mixed hyperlipidemia  Assessment & Plan:  Is eating low fat diet   Continue with this and check flp     Orders:  -     Comprehensive Metabolic Panel; Future  -     Lipid Panel; Future  -     Vitamin D,25-Hydroxy; Future  -     Comprehensive Metabolic Panel  -     Lipid Panel  -     Vitamin D,25-Hydroxy    Return in about 6 months (around 8/27/2024) for Recheck.    Berta Loja MD  Signed Berta Loja MD

## 2024-02-29 RX ORDER — LEVOTHYROXINE SODIUM 0.07 MG/1
TABLET ORAL
Qty: 45 TABLET | Refills: 1 | Status: SHIPPED | OUTPATIENT
Start: 2024-02-29

## 2024-02-29 RX ORDER — LEVOTHYROXINE SODIUM 88 UG/1
TABLET ORAL
Qty: 45 TABLET | Refills: 1 | Status: SHIPPED | OUTPATIENT
Start: 2024-02-29

## 2024-05-20 ENCOUNTER — TELEPHONE (OUTPATIENT)
Dept: ENDOCRINOLOGY | Facility: CLINIC | Age: 59
End: 2024-05-20
Payer: COMMERCIAL

## 2024-05-20 DIAGNOSIS — E03.9 ACQUIRED HYPOTHYROIDISM: Primary | ICD-10-CM

## 2024-05-20 NOTE — TELEPHONE ENCOUNTER
"    Caller: Dory Mccord \"Christianne\"    Relationship: Self    Best call back number: [phone]    What orders are you requesting (i.e. lab or imaging): LAB     In what timeframe would the patient need to come in: TODAY     Where will you receive your lab/imaging services: DR SOLIMAN OFFICE IN [place]      Additional notes: PT WOULD LIKE LAB ORDERS SENT TO DR SOLIMAN'S OFFICE. HE IS [facility] AFFILIATED. PLEASE ALSO CALL PT ASAP AND ADV ON NEXT STEPS AS SHE WAS UNSURE IF THOSE WOULD BE IN MYCHART OR FAXED AND HOW TO GO ABOUT GETTING THEM DONE.       " Render Post Care In The Note?: yes Medical Necessity Information: It is in your best interest to select a reason for this procedure from the list below. All of these items fulfill various CMS LCD requirements except the new and changing color options. Detail Level: Generalized Add 52 Modifier (Optional): no Total Number Of Lesions Treated: 4 Number Of Freeze-Thaw Cycles: 3 freeze-thaw cycles Consent: The patient's consent was obtained including but not limited to risks of crusting, scabbing, blistering, scarring, darker or lighter pigmentary change, recurrence, incomplete removal and infection. Duration Of Freeze Thaw-Cycle (Seconds): 5 Post-Care Instructions: I reviewed with the patient in detail post-care instructions. Patient is to wear sunprotection, and avoid picking at any of the treated lesions. Pt may apply Vaseline to crusted or scabbing areas. Medical Necessity Clause: This procedure was medically necessary because the lesions that were treated were: Spray Paint Text: The liquid nitrogen was applied to the skin utilizing a spray paint frosting technique.

## 2024-05-20 NOTE — TELEPHONE ENCOUNTER
Pt states it is Dr Miles Kate at [address] [place] [address]  She states she will be able to view the orders in [facility] and no need to mail a copy to her  She will call back if needed

## 2024-05-22 ENCOUNTER — CLINICAL SUPPORT (OUTPATIENT)
Dept: FAMILY MEDICINE CLINIC | Facility: CLINIC | Age: 59
End: 2024-05-22
Payer: COMMERCIAL

## 2024-05-22 DIAGNOSIS — E03.9 ACQUIRED HYPOTHYROIDISM: ICD-10-CM

## 2024-05-22 PROCEDURE — 36415 COLL VENOUS BLD VENIPUNCTURE: CPT | Performed by: NURSE PRACTITIONER

## 2024-05-22 NOTE — PROGRESS NOTES
Venipuncture Blood Specimen Collection  Venipuncture performed in left arm by Martha Ramos MA with good hemostasis. Patient tolerated the procedure well without complications.   05/22/24   Martha Ramos MA

## 2024-05-23 LAB — TSH SERPL DL<=0.005 MIU/L-ACNC: 0.33 UIU/ML (ref 0.45–4.5)

## 2024-05-24 LAB
T4 FREE SERPL-MCNC: 1.54 NG/DL (ref 0.82–1.77)
WRITTEN AUTHORIZATION: NORMAL

## 2024-08-27 ENCOUNTER — OFFICE VISIT (OUTPATIENT)
Dept: ENDOCRINOLOGY | Facility: CLINIC | Age: 59
End: 2024-08-27
Payer: COMMERCIAL

## 2024-08-27 VITALS
HEART RATE: 72 BPM | SYSTOLIC BLOOD PRESSURE: 124 MMHG | OXYGEN SATURATION: 100 % | DIASTOLIC BLOOD PRESSURE: 86 MMHG | BODY MASS INDEX: 23.18 KG/M2 | WEIGHT: 157 LBS

## 2024-08-27 DIAGNOSIS — E78.2 MIXED HYPERLIPIDEMIA: ICD-10-CM

## 2024-08-27 DIAGNOSIS — R79.89 ELEVATED LIVER FUNCTION TESTS: Primary | ICD-10-CM

## 2024-08-27 DIAGNOSIS — E03.9 ACQUIRED HYPOTHYROIDISM: ICD-10-CM

## 2024-08-27 DIAGNOSIS — I10 PRIMARY HYPERTENSION: ICD-10-CM

## 2024-08-27 LAB
ALBUMIN SERPL-MCNC: 4.6 G/DL (ref 3.5–5.2)
ALBUMIN/GLOB SERPL: 1.8 G/DL
ALP SERPL-CCNC: 57 U/L (ref 39–117)
ALT SERPL W P-5'-P-CCNC: 26 U/L (ref 1–33)
ANION GAP SERPL CALCULATED.3IONS-SCNC: 11.7 MMOL/L (ref 5–15)
AST SERPL-CCNC: 22 U/L (ref 1–32)
BASOPHILS # BLD AUTO: 0.04 10*3/MM3 (ref 0–0.2)
BASOPHILS NFR BLD AUTO: 0.5 % (ref 0–1.5)
BILIRUB SERPL-MCNC: 0.6 MG/DL (ref 0–1.2)
BUN SERPL-MCNC: 14 MG/DL (ref 8–23)
BUN/CREAT SERPL: 16.7 (ref 7–25)
CALCIUM SPEC-SCNC: 10.4 MG/DL (ref 8.6–10.5)
CHLORIDE SERPL-SCNC: 103 MMOL/L (ref 98–107)
CHOLEST SERPL-MCNC: 205 MG/DL (ref 0–200)
CO2 SERPL-SCNC: 27.3 MMOL/L (ref 22–29)
CREAT SERPL-MCNC: 0.84 MG/DL (ref 0.57–1)
DEPRECATED RDW RBC AUTO: 43.7 FL (ref 37–54)
EGFRCR SERPLBLD CKD-EPI 2021: 79.2 ML/MIN/1.73
EOSINOPHIL # BLD AUTO: 0.02 10*3/MM3 (ref 0–0.4)
EOSINOPHIL NFR BLD AUTO: 0.3 % (ref 0.3–6.2)
ERYTHROCYTE [DISTWIDTH] IN BLOOD BY AUTOMATED COUNT: 13.1 % (ref 12.3–15.4)
GLOBULIN UR ELPH-MCNC: 2.5 GM/DL
GLUCOSE SERPL-MCNC: 88 MG/DL (ref 65–99)
HCT VFR BLD AUTO: 40.9 % (ref 34–46.6)
HDLC SERPL-MCNC: 78 MG/DL (ref 40–60)
HGB BLD-MCNC: 13.4 G/DL (ref 12–15.9)
IMM GRANULOCYTES # BLD AUTO: 0.03 10*3/MM3 (ref 0–0.05)
IMM GRANULOCYTES NFR BLD AUTO: 0.4 % (ref 0–0.5)
LDLC SERPL CALC-MCNC: 116 MG/DL (ref 0–100)
LDLC/HDLC SERPL: 1.48 {RATIO}
LYMPHOCYTES # BLD AUTO: 1.62 10*3/MM3 (ref 0.7–3.1)
LYMPHOCYTES NFR BLD AUTO: 21.8 % (ref 19.6–45.3)
MCH RBC QN AUTO: 30 PG (ref 26.6–33)
MCHC RBC AUTO-ENTMCNC: 32.8 G/DL (ref 31.5–35.7)
MCV RBC AUTO: 91.7 FL (ref 79–97)
MONOCYTES # BLD AUTO: 0.49 10*3/MM3 (ref 0.1–0.9)
MONOCYTES NFR BLD AUTO: 6.6 % (ref 5–12)
NEUTROPHILS NFR BLD AUTO: 5.23 10*3/MM3 (ref 1.7–7)
NEUTROPHILS NFR BLD AUTO: 70.4 % (ref 42.7–76)
NRBC BLD AUTO-RTO: 0 /100 WBC (ref 0–0.2)
PLATELET # BLD AUTO: 218 10*3/MM3 (ref 140–450)
PMV BLD AUTO: 11.2 FL (ref 6–12)
POTASSIUM SERPL-SCNC: 3.8 MMOL/L (ref 3.5–5.2)
PROT SERPL-MCNC: 7.1 G/DL (ref 6–8.5)
RBC # BLD AUTO: 4.46 10*6/MM3 (ref 3.77–5.28)
SODIUM SERPL-SCNC: 142 MMOL/L (ref 136–145)
T4 FREE SERPL-MCNC: 1.57 NG/DL (ref 0.92–1.68)
TRIGL SERPL-MCNC: 58 MG/DL (ref 0–150)
TSH SERPL DL<=0.05 MIU/L-ACNC: 0.81 UIU/ML (ref 0.27–4.2)
VLDLC SERPL-MCNC: 11 MG/DL (ref 5–40)
WBC NRBC COR # BLD AUTO: 7.43 10*3/MM3 (ref 3.4–10.8)

## 2024-08-27 PROCEDURE — 82306 VITAMIN D 25 HYDROXY: CPT | Performed by: INTERNAL MEDICINE

## 2024-08-27 PROCEDURE — 83036 HEMOGLOBIN GLYCOSYLATED A1C: CPT | Performed by: INTERNAL MEDICINE

## 2024-08-27 PROCEDURE — 80061 LIPID PANEL: CPT | Performed by: INTERNAL MEDICINE

## 2024-08-27 PROCEDURE — 99214 OFFICE O/P EST MOD 30 MIN: CPT | Performed by: INTERNAL MEDICINE

## 2024-08-27 PROCEDURE — 84439 ASSAY OF FREE THYROXINE: CPT | Performed by: INTERNAL MEDICINE

## 2024-08-27 PROCEDURE — 80050 GENERAL HEALTH PANEL: CPT | Performed by: INTERNAL MEDICINE

## 2024-08-27 NOTE — PROGRESS NOTES
Dory Mccord 59 y.o.  CC: follow up hypothyroid, hypertension    Tangirnaq: follow up hypothyroid, hypertension    Energy is good overall- occ bouts of severe fatigue  Is taking 75 mcg synthroid a/w 88 mcg synthroid every other day   Bp is high- recheck 120 / 72    Allergies   Allergen Reactions    Flagyl [Metronidazole] Itching    Oxycodone-Acetaminophen Itching       Current Outpatient Medications:     ascorbic acid (VITAMIN C) 250 MG tablet, take 2 daily, Disp: , Rfl:     Azelastine-Fluticasone 137-50 MCG/ACT suspension, 1 spray into the nostril(s) as directed by provider Every 12 (Twelve) Hours., Disp: , Rfl:     Bacillus Coagulans-Inulin (Probiotic) 1-250 BILLION-MG capsule, once daily, Disp: , Rfl:     Cholecalciferol 25 MCG (1000 UT) tablet, Take 2 tablets by mouth Daily., Disp: , Rfl:     Cyanocobalamin ER 1000 MCG tablet controlled-release, Take 500 mg by mouth Every Other Day., Disp: , Rfl:     esomeprazole (nexIUM) 20 MG capsule, Take 1 capsule by mouth., Disp: , Rfl:     FIBER COMPLETE PO, Take  by mouth 2 (Two) Times a Day., Disp: , Rfl:     hydroCHLOROthiazide (HYDRODIURIL) 25 MG tablet, , Disp: , Rfl:     levothyroxine (Synthroid) 75 MCG tablet, Take 1 pill every other day alternating with 88 mcg dose, Disp: 45 tablet, Rfl: 1    levothyroxine (SYNTHROID, LEVOTHROID) 88 MCG tablet, Take 1 every other day alternating with 75 mcg dose, Disp: 45 tablet, Rfl: 1    LORazepam (ATIVAN) 0.5 MG tablet, Take  by mouth., Disp: , Rfl:     Patient Active Problem List    Diagnosis     Elevated liver function tests [R79.89]     Cellulitis of left orbital region [H05.012]     Vision changes [H53.9]     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]     Chronic maxillary sinusitis [J32.0]     Diverticulitis [K57.92]     Acute stress disorder [F43.0]     Atrial premature depolarization [I49.1]     Chest pain [R07.9]     Hiatal hernia [K44.9]     Hyperlipidemia [E78.5]     Hypertension [I10]     Hypothyroidism [E03.9]     Irritable bowel  syndrome [K58.9]     Uterine leiomyoma [D25.9]     Mass of breast [N63.0]     Calculus of kidney [N20.0]     Obstructive sleep apnea syndrome [G47.33]     Cyst of ovary [N83.209]     Palpitations [R00.2]     Seasonal allergic rhinitis [J30.2]     Vitamin D deficiency [E55.9]      Review of Systems   Constitutional:  Negative for activity change, appetite change and unexpected weight change.   HENT:  Negative for congestion and rhinorrhea.    Eyes:  Negative for visual disturbance.   Respiratory:  Negative for cough and shortness of breath.    Cardiovascular:  Negative for palpitations and leg swelling.   Gastrointestinal:  Negative for constipation, diarrhea and nausea.   Genitourinary:  Negative for hematuria.   Musculoskeletal:  Negative for arthralgias, back pain, gait problem, joint swelling and myalgias.   Skin:  Negative for color change, rash and wound.   Allergic/Immunologic: Negative for environmental allergies, food allergies and immunocompromised state.   Neurological:  Negative for dizziness, weakness and light-headedness.   Psychiatric/Behavioral:  Negative for confusion, decreased concentration, dysphoric mood and sleep disturbance. The patient is not nervous/anxious.      Social History     Socioeconomic History    Marital status:    Tobacco Use    Smoking status: Former     Current packs/day: 0.00     Types: Cigarettes    Smokeless tobacco: Never   Vaping Use    Vaping status: Never Used   Substance and Sexual Activity    Alcohol use: Yes     Alcohol/week: 2.0 standard drinks of alcohol     Types: 2 Cans of beer per week     Comment: 4 drinks per week    Drug use: No    Sexual activity: Yes     Partners: Male     Birth control/protection: Post-menopausal     Family History   Problem Relation Age of Onset    Diabetes Mother         Acquired after heart surgery    Heart disease Mother     Melanoma Mother     Kidney disease Mother     Hypertension Mother     Cancer Father         Lung cancer     Cancer Maternal Grandmother         Breast cancer    Breast cancer Maternal Grandmother     Arthritis Other     Breast cancer Other     Hyperlipidemia Other     Hypertension Other     Lung cancer Other     Stroke Other      /86   Pulse 72   Wt 71.2 kg (157 lb)   SpO2 100%   BMI 23.18 kg/m²   Physical Exam  Vitals and nursing note reviewed.   Constitutional:       Appearance: Normal appearance. She is well-developed.   HENT:      Head: Normocephalic and atraumatic.   Eyes:      General: Lids are normal.      Extraocular Movements: Extraocular movements intact.      Conjunctiva/sclera: Conjunctivae normal.      Pupils: Pupils are equal, round, and reactive to light.   Neck:      Thyroid: No thyroid mass or thyromegaly.      Vascular: No carotid bruit.      Trachea: Trachea normal. No tracheal deviation.   Cardiovascular:      Rate and Rhythm: Normal rate and regular rhythm.      Pulses: Normal pulses.      Heart sounds: Normal heart sounds. No murmur heard.     No friction rub. No gallop.   Pulmonary:      Effort: Pulmonary effort is normal. No respiratory distress.      Breath sounds: Normal breath sounds. No wheezing.   Musculoskeletal:         General: No deformity. Normal range of motion.      Cervical back: Normal range of motion and neck supple.   Lymphadenopathy:      Cervical: No cervical adenopathy.   Skin:     General: Skin is warm and dry.      Findings: No erythema or rash.      Nails: There is no clubbing.   Neurological:      General: No focal deficit present.      Mental Status: She is alert and oriented to person, place, and time.      Cranial Nerves: No cranial nerve deficit.      Deep Tendon Reflexes: Reflexes are normal and symmetric. Reflexes normal.   Psychiatric:         Mood and Affect: Mood normal.         Speech: Speech normal.         Behavior: Behavior normal.         Thought Content: Thought content normal.         Judgment: Judgment normal.       Results for orders placed or  performed in visit on 05/22/24   T4, Free   Result Value Ref Range    Free T4 1.54 0.82 - 1.77 ng/dL   Written Authorization   Result Value Ref Range    Written Authorization Comment      Diagnoses and all orders for this visit:    1. Elevated liver function tests (Primary)  Assessment & Plan:  Check cmp       Orders:  -     Comprehensive Metabolic Panel; Future  -     Hemoglobin A1c; Future  -     Vitamin D,25-Hydroxy; Future  -     Comprehensive Metabolic Panel  -     Hemoglobin A1c  -     Vitamin D,25-Hydroxy    2. Mixed hyperlipidemia  Assessment & Plan:  Update flp   Eating low fat diet     Orders:  -     Lipid Panel; Future  -     TSH; Future  -     T4, Free; Future  -     Lipid Panel  -     TSH  -     T4, Free    3. Primary hypertension  Assessment & Plan:  BP is good overall- continue monitoring   Recheck is normal     Orders:  -     CBC Auto Differential; Future  -     CBC Auto Differential    4. Acquired hypothyroidism  Assessment & Plan:  Is taking levothyroxine 75 mcg a/w 88 mcg every other day   Check tfts     Orders:  -     TSH; Future  -     T4, Free; Future  -     TSH  -     T4, Free    Return in about 6 months (around 2/27/2025) for Recheck.    Electronically signed by: Berta Loja MD

## 2024-08-28 LAB
25(OH)D3 SERPL-MCNC: 80.1 NG/ML (ref 30–100)
HBA1C MFR BLD: 5.2 % (ref 4.8–5.6)

## 2024-09-03 RX ORDER — LEVOTHYROXINE SODIUM 88 UG/1
TABLET ORAL
Qty: 45 TABLET | Refills: 0 | Status: SHIPPED | OUTPATIENT
Start: 2024-09-03

## 2024-09-03 NOTE — TELEPHONE ENCOUNTER
Rx Refill Note  Requested Prescriptions     Pending Prescriptions Disp Refills    levothyroxine (SYNTHROID, LEVOTHROID) 88 MCG tablet [Pharmacy Med Name: LEVOTHYROXINE 88 MCG TABLET] 90 tablet 1     Sig: TAKE 1 TABLET BY MOUTH DAILY. REPLACES 75 MCG DAILY DOSE      Last office visit with prescribing clinician: 8/27/2024   Next office visit with prescribing clinician: 3/3/2025                           Nan Castro MA  09/03/24, 10:33 EDT

## 2024-09-13 RX ORDER — LEVOTHYROXINE SODIUM 75 UG/1
TABLET ORAL
Qty: 45 TABLET | Refills: 0 | OUTPATIENT
Start: 2024-09-13

## 2024-09-18 RX ORDER — LEVOTHYROXINE SODIUM 75 UG/1
TABLET ORAL
Qty: 45 TABLET | Refills: 1 | Status: SHIPPED | OUTPATIENT
Start: 2024-09-18

## 2024-10-17 ENCOUNTER — OFFICE VISIT (OUTPATIENT)
Dept: OBSTETRICS AND GYNECOLOGY | Facility: CLINIC | Age: 59
End: 2024-10-17
Payer: COMMERCIAL

## 2024-10-17 VITALS
SYSTOLIC BLOOD PRESSURE: 128 MMHG | BODY MASS INDEX: 23.25 KG/M2 | HEIGHT: 69 IN | DIASTOLIC BLOOD PRESSURE: 78 MMHG | WEIGHT: 157 LBS

## 2024-10-17 DIAGNOSIS — Z01.419 WOMEN'S ANNUAL ROUTINE GYNECOLOGICAL EXAMINATION: Primary | ICD-10-CM

## 2024-10-17 DIAGNOSIS — Z12.39 ENCOUNTER FOR BREAST CANCER SCREENING USING NON-MAMMOGRAM MODALITY: ICD-10-CM

## 2024-10-17 DIAGNOSIS — Z78.0 POSTMENOPAUSAL STATUS: ICD-10-CM

## 2024-10-17 DIAGNOSIS — N95.8 GENITOURINARY SYNDROME OF MENOPAUSE: ICD-10-CM

## 2024-10-17 DIAGNOSIS — Z13.820 OSTEOPOROSIS SCREENING: ICD-10-CM

## 2024-10-17 NOTE — PROGRESS NOTES
Gynecologic Annual Exam Note        GYN Annual Exam     CC - Here for annual exam.        HPI  Dory Mccord is a 59 y.o. female, , who presents for annual well woman exam as a established patient.  She is postmenopausal.. Denies vaginal bleeding.   There were no changes to her medical or surgical history since her last visit. Marital Status: .  She is sexually active. Patient states that it is not very frequent due to painful IC and decreased libido. She has not had new partners.. STD testing recommendations have been explained to the patient and she declines STD testing.    The patient would like to discuss the following complaints today: dyspareunia, vaginal dryness, & decreased libido    Additional OB/GYN History   On HRT? No    Last Pap : 23. Results: negative. HPV: negative.   Last Completed Pap Smear            PAP SMEAR (Every 3 Years) Next due on 2023  LIQUID-BASED PAP SMEAR WITH HPV GENOTYPING REGARDLESS OF INTERPRETATION (PATRICE,COR,MAD)    2021  SCANNED - PAP SMEAR    2018  Done - neg, neg HPV                  History of abnormal Pap smear: no  Family history of uterine, colon, breast, or ovarian cancer: yes - Breast Ca- MGM  Performs monthly Self-Breast Exam: no  Last mammogram: 24. Done at [facility]. There is not a copy in the chart.    Last Completed Mammogram       This patient has no relevant Health Maintenance data.          Last colonoscopy: has had a colonoscopy 6 years ago    Last Completed Colonoscopy            COLORECTAL CANCER SCREENING (COLONOSCOPY - Every 10 Years) Next due on 2018  COLONOSCOPY (Done)                    Her last bone density scan was 1 year ago and results were Osteoporosis  Exercises Regularly: yes, pickle ball   Feelings of Anxiety or Depression: no      Tobacco Usage?: No       Current Outpatient Medications:     ascorbic acid (VITAMIN C) 250 MG tablet, take 2 daily, Disp: ,  Rfl:     Azelastine-Fluticasone 137-50 MCG/ACT suspension, 1 spray into the nostril(s) as directed by provider Every 12 (Twelve) Hours., Disp: , Rfl:     Bacillus Coagulans-Inulin (Probiotic) 1-250 BILLION-MG capsule, once daily, Disp: , Rfl:     Cholecalciferol 25 MCG (1000 UT) tablet, Take 2 tablets by mouth Daily., Disp: , Rfl:     Cyanocobalamin ER 1000 MCG tablet controlled-release, Take 500 mg by mouth Every Other Day., Disp: , Rfl:     esomeprazole (nexIUM) 20 MG capsule, Take 1 capsule by mouth., Disp: , Rfl:     FIBER COMPLETE PO, Take  by mouth 2 (Two) Times a Day., Disp: , Rfl:     hydroCHLOROthiazide (HYDRODIURIL) 25 MG tablet, , Disp: , Rfl:     levothyroxine (Synthroid) 75 MCG tablet, Take 1 pill every other day alternating with 88 mcg dose, Disp: 45 tablet, Rfl: 1    levothyroxine (SYNTHROID, LEVOTHROID) 88 MCG tablet, TAKE 1 TABLET BY MOUTH DAILY. ALTERNATE WITH 75 MCG DAILY DOSE, Disp: 45 tablet, Rfl: 0    LORazepam (ATIVAN) 0.5 MG tablet, Take  by mouth., Disp: , Rfl:     Patient denies the need for medication refills today.    OB History          2    Para   2    Term                AB        Living   2         SAB        IAB        Ectopic        Molar        Multiple        Live Births   2                Past Medical History:   Diagnosis Date    Abnormal ECG     Acute reaction to stress     Acute suppurative otitis media     Endometriosis     Hiatal hernia     History of stress test     chest pain. normal    Hyperlipidemia 2020    Hypertension     Hypothyroidism     Leiomyoma of uterus     Lump or mass in breast      p/o biopsy     Nephrolithiasis     Ovarian cyst     Palpitations      s/p holter 6/15 - per cardio - try beat blocker    PONV (postoperative nausea and vomiting)     Seasonal allergies     Varicella 1965    Vitamin D deficiency         Past Surgical History:   Procedure Laterality Date    BREAST BIOPSY      BREAST SURGERY      biopsy       "SECTION       x 2 1986, 1989    KIDNEY SURGERY  02/2013     Lithotripsy    LAPAROSCOPY HYSTEROSCOPY ENDOMETRIAL ABLATION  2010    OTHER SURGICAL HISTORY      Gyn Laparoscopy With Adhesiolysis Broad Ligaments. 1993, 2013    PELVIC LAPAROSCOPY      WISDOM TOOTH EXTRACTION  1985       Health Maintenance   Topic Date Due    TDAP/TD VACCINES (1 - Tdap) Never done    ZOSTER VACCINE (1 of 2) Never done    ANNUAL PHYSICAL  Never done    INFLUENZA VACCINE  Never done    COVID-19 Vaccine (3 - 2023-24 season) 09/01/2024    Annual Gynecologic Pelvic and Breast Exam  09/01/2024    MAMMOGRAM  01/02/2025    LIPID PANEL  08/27/2025    DXA SCAN  10/03/2025    MOST FORM  10/17/2025    PAP SMEAR  08/31/2026    COLORECTAL CANCER SCREENING  06/01/2028    HEPATITIS C SCREENING  Completed    Pneumococcal Vaccine 0-64  Aged Out       The additional following portions of the patient's history were reviewed and updated as appropriate: allergies, current medications, past family history, past medical history, past social history, past surgical history, and problem list.    Review of Systems   Constitutional: Negative.    HENT: Negative.     Eyes: Negative.    Respiratory: Negative.     Cardiovascular: Negative.    Gastrointestinal: Negative.    Endocrine: Negative.    Genitourinary:  Positive for decreased libido and dyspareunia.        Vaginal dryness   Musculoskeletal: Negative.    Skin: Negative.    Allergic/Immunologic: Negative.    Neurological: Negative.    Hematological: Negative.    Psychiatric/Behavioral: Negative.         I have reviewed and agree with the HPI, ROS, and historical information as entered above. Amanda Monroy MD      Objective   /78   Ht 175.3 cm (69\")   Wt 71.2 kg (157 lb)   LMP 10/03/2010 (Within Months)   BMI 23.18 kg/m²     Physical Exam  Vitals and nursing note reviewed. Exam conducted with a chaperone present.   Constitutional:       Appearance: She is well-developed.   HENT:      Head: Normocephalic " and atraumatic.   Neck:      Thyroid: No thyroid mass or thyromegaly.   Cardiovascular:      Rate and Rhythm: Normal rate and regular rhythm.      Heart sounds: No murmur heard.  Pulmonary:      Effort: Pulmonary effort is normal. No retractions.      Breath sounds: Normal breath sounds. No wheezing, rhonchi or rales.   Chest:      Chest wall: No mass or tenderness.   Breasts:     Right: Normal. No mass, nipple discharge, skin change or tenderness.      Left: Normal. No mass, nipple discharge, skin change or tenderness.   Abdominal:      General: Bowel sounds are normal.      Palpations: Abdomen is soft. Abdomen is not rigid. There is no mass.      Tenderness: There is no abdominal tenderness. There is no guarding.      Hernia: No hernia is present. There is no hernia in the left inguinal area.   Genitourinary:     Labia:         Right: No rash, tenderness or lesion.         Left: No rash, tenderness or lesion.       Vagina: Normal. No vaginal discharge or lesions.      Cervix: No cervical motion tenderness, discharge, lesion or cervical bleeding.      Uterus: Normal. Not enlarged, not fixed and not tender.       Adnexa:         Right: No mass or tenderness.          Left: No mass or tenderness.        Rectum: No external hemorrhoid.   Musculoskeletal:      Cervical back: Normal range of motion. No muscular tenderness.   Neurological:      Mental Status: She is alert and oriented to person, place, and time.   Psychiatric:         Behavior: Behavior normal.            Assessment and Plan    Problem List Items Addressed This Visit    None  Visit Diagnoses       Women's annual routine gynecological examination    -  Primary    Encounter for breast cancer screening using non-mammogram modality        Genitourinary syndrome of menopause        Postmenopausal status        Osteoporosis screening        Relevant Orders    DEXA Bone Density Axial            GYN annual well woman exam.   Recommended use of Vitamin D  replacement and getting adequate calcium in her diet. (1500mg)  Reviewed monthly self breast exams.  Instructed to call with lumps, pain, or breast discharge.    Continue yearly mammography  Reviewed HPV guidelines.  Reviewed exercise as a preventative health measures.   GSM/ vaginal dryness - info given on options  Return in about 1 year (around 10/17/2025), or with BDS.         Amanda Monroy MD  10/17/2024

## 2024-10-21 RX ORDER — LEVOTHYROXINE SODIUM 88 UG/1
TABLET ORAL
Qty: 45 TABLET | Refills: 1 | Status: SHIPPED | OUTPATIENT
Start: 2024-10-21

## 2024-10-21 NOTE — TELEPHONE ENCOUNTER
Rx Refill Note    Requested Prescriptions     Pending Prescriptions Disp Refills    levothyroxine (SYNTHROID, LEVOTHROID) 88 MCG tablet 45 tablet 0     Sig: TAKE 1 TABLET BY MOUTH DAILY. ALTERNATE WITH 75 MCG DAILY DOSE        Last office visit with prescribing clinician: 8/27/2024     Next office visit with prescribing clinician: 3/3/2025   {

## 2024-10-23 RX ORDER — LEVOTHYROXINE SODIUM 88 UG/1
TABLET ORAL
Qty: 45 TABLET | Refills: 1 | OUTPATIENT
Start: 2024-10-23

## 2025-02-24 NOTE — TELEPHONE ENCOUNTER
Rx Refill Note  Requested Prescriptions     Pending Prescriptions Disp Refills    levothyroxine (SYNTHROID, LEVOTHROID) 75 MCG tablet [Pharmacy Med Name: Levothyroxine Sodium 75 MCG Oral Tablet] 45 tablet 0     Sig: TAKE 1 TABLET BY MOUTH EVERY OTHER DAY ALTERNATING WITH 88 MCG DOSE      Last office visit with prescribing clinician: 08/27/2024 with Dr. Loja     Next office visit with prescribing clinician: 03/03/2025 with Dr. Freedom Doll  02/24/25, 11:30 EST

## 2025-03-03 ENCOUNTER — OFFICE VISIT (OUTPATIENT)
Dept: ENDOCRINOLOGY | Facility: CLINIC | Age: 60
End: 2025-03-03
Payer: COMMERCIAL

## 2025-03-03 VITALS
HEART RATE: 60 BPM | SYSTOLIC BLOOD PRESSURE: 132 MMHG | BODY MASS INDEX: 23.7 KG/M2 | HEIGHT: 69 IN | DIASTOLIC BLOOD PRESSURE: 76 MMHG | WEIGHT: 160 LBS | OXYGEN SATURATION: 98 %

## 2025-03-03 DIAGNOSIS — E78.2 MIXED HYPERLIPIDEMIA: Primary | ICD-10-CM

## 2025-03-03 DIAGNOSIS — I10 PRIMARY HYPERTENSION: ICD-10-CM

## 2025-03-03 DIAGNOSIS — E55.9 VITAMIN D DEFICIENCY: ICD-10-CM

## 2025-03-03 DIAGNOSIS — E03.9 ACQUIRED HYPOTHYROIDISM: ICD-10-CM

## 2025-03-03 PROCEDURE — 84443 ASSAY THYROID STIM HORMONE: CPT | Performed by: INTERNAL MEDICINE

## 2025-03-03 PROCEDURE — 83540 ASSAY OF IRON: CPT | Performed by: INTERNAL MEDICINE

## 2025-03-03 PROCEDURE — 82607 VITAMIN B-12: CPT | Performed by: INTERNAL MEDICINE

## 2025-03-03 PROCEDURE — 84439 ASSAY OF FREE THYROXINE: CPT | Performed by: INTERNAL MEDICINE

## 2025-03-03 PROCEDURE — 82306 VITAMIN D 25 HYDROXY: CPT | Performed by: INTERNAL MEDICINE

## 2025-03-03 PROCEDURE — 86038 ANTINUCLEAR ANTIBODIES: CPT | Performed by: INTERNAL MEDICINE

## 2025-03-03 PROCEDURE — 80053 COMPREHEN METABOLIC PANEL: CPT | Performed by: INTERNAL MEDICINE

## 2025-03-03 PROCEDURE — 80061 LIPID PANEL: CPT | Performed by: INTERNAL MEDICINE

## 2025-03-03 PROCEDURE — 99214 OFFICE O/P EST MOD 30 MIN: CPT | Performed by: INTERNAL MEDICINE

## 2025-03-03 PROCEDURE — 86225 DNA ANTIBODY NATIVE: CPT | Performed by: INTERNAL MEDICINE

## 2025-03-03 NOTE — PROGRESS NOTES
Dory Mccord 59 y.o.  CC: follow up Hypothyroid     [place]: follow up Hypothyroid    Worsening allergies  Taking synthroid 75 mcg a/w 88 mcg every other day   BP is good today taking hctz   Hair thinning over crown- discussed derm referral, effect of lower estrogen  Be sure she is getting plenty of protein     Allergies   Allergen Reactions    Flagyl [Metronidazole] Itching    Oxycodone-Acetaminophen Itching       Current Outpatient Medications:     ascorbic acid (VITAMIN C) 250 MG tablet, take 2 daily, Disp: , Rfl:     Azelastine-Fluticasone 137-50 MCG/ACT suspension, 1 spray into the nostril(s) as directed by provider Every 12 (Twelve) Hours., Disp: , Rfl:     Bacillus Coagulans-Inulin (Probiotic) 1-250 BILLION-MG capsule, once daily, Disp: , Rfl:     Cholecalciferol 25 MCG (1000 UT) tablet, Take 2 tablets by mouth Daily., Disp: , Rfl:     Cyanocobalamin ER 1000 MCG tablet controlled-release, Take 500 mg by mouth Every Other Day., Disp: , Rfl:     esomeprazole (nexIUM) 20 MG capsule, Take 1 capsule by mouth., Disp: , Rfl:     FIBER COMPLETE PO, Take  by mouth 2 (Two) Times a Day., Disp: , Rfl:     hydroCHLOROthiazide (HYDRODIURIL) 25 MG tablet, , Disp: , Rfl:     levothyroxine (Synthroid) 75 MCG tablet, Take 1 pill every other day alternating with 88 mcg dose, Disp: 45 tablet, Rfl: 1    levothyroxine (SYNTHROID, LEVOTHROID) 88 MCG tablet, TAKE 1 TABLET BY MOUTH DAILY. ALTERNATE WITH 75 MCG DAILY DOSE, Disp: 45 tablet, Rfl: 1    LORazepam (ATIVAN) 0.5 MG tablet, Take  by mouth., Disp: , Rfl:     Patient Active Problem List    Diagnosis     Elevated liver function tests [R79.89]     Cellulitis of left orbital region [H05.012]     Vision changes [H53.9]     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]     Chronic maxillary sinusitis [J32.0]     Diverticulitis [K57.92]     Acute stress disorder [F43.0]     Atrial premature depolarization [I49.1]     Chest pain [R07.9]     Hiatal hernia [K44.9]     Hyperlipidemia [E78.5]     Hypertension  [I10]     Hypothyroidism [E03.9]     Irritable bowel syndrome [K58.9]     Uterine leiomyoma [D25.9]     Mass of breast [N63.0]     Calculus of kidney [N20.0]     Obstructive sleep apnea syndrome [G47.33]     Cyst of ovary [N83.209]     Palpitations [R00.2]     Seasonal allergic rhinitis [J30.2]     Vitamin D deficiency [E55.9]      Review of Systems   Constitutional:  Negative for activity change, appetite change and unexpected weight change.   HENT:  Negative for congestion and rhinorrhea.    Eyes:  Negative for visual disturbance.   Respiratory:  Negative for cough and shortness of breath.    Cardiovascular:  Negative for palpitations and leg swelling.   Gastrointestinal:  Negative for constipation, diarrhea and nausea.   Genitourinary:  Negative for hematuria.   Musculoskeletal:  Negative for arthralgias, back pain, gait problem, joint swelling and myalgias.   Skin:  Negative for color change, rash and wound.   Allergic/Immunologic: Negative for environmental allergies, food allergies and immunocompromised state.   Neurological:  Negative for dizziness, weakness and light-headedness.   Psychiatric/Behavioral:  Negative for confusion, decreased concentration, dysphoric mood and sleep disturbance. The patient is not nervous/anxious.      Social History     Socioeconomic History    Marital status:    Tobacco Use    Smoking status: Former     Current packs/day: 0.00     Types: Cigarettes    Smokeless tobacco: Never   Vaping Use    Vaping status: Never Used   Substance and Sexual Activity    Alcohol use: Yes     Alcohol/week: 2.0 standard drinks of alcohol     Types: 2 Cans of beer per week     Comment: 4 drinks per week    Drug use: No    Sexual activity: Yes     Partners: Male     Birth control/protection: Post-menopausal     Family History   Problem Relation Age of Onset    Cancer Father         Lung cancer    Diabetes Mother         Acquired after heart surgery    Heart disease Mother     Melanoma Mother   "   Kidney disease Mother     Hypertension Mother     Cancer Maternal Grandmother         Breast cancer    Breast cancer Maternal Grandmother     Arthritis Other     Hyperlipidemia Other     Hypertension Other     Lung cancer Other     Stroke Other     Ovarian cancer Neg Hx     Uterine cancer Neg Hx     Colon cancer Neg Hx      /76   Pulse 60   Ht 175.3 cm (69.02\")   Wt 72.6 kg (160 lb)   LMP 10/03/2010 (Within Months)   SpO2 98%   BMI 23.62 kg/m²   Physical Exam  Vitals and nursing note reviewed.   Constitutional:       Appearance: Normal appearance. She is well-developed.   HENT:      Head: Normocephalic and atraumatic.   Eyes:      General: Lids are normal.      Extraocular Movements: Extraocular movements intact.      Conjunctiva/sclera: Conjunctivae normal.      Pupils: Pupils are equal, round, and reactive to light.   Neck:      Thyroid: No thyroid mass or thyromegaly.      Vascular: No carotid bruit.      Trachea: Trachea normal. No tracheal deviation.   Cardiovascular:      Rate and Rhythm: Normal rate and regular rhythm.      Pulses: Normal pulses.      Heart sounds: Normal heart sounds. No murmur heard.     No friction rub. No gallop.   Pulmonary:      Effort: Pulmonary effort is normal. No respiratory distress.      Breath sounds: Normal breath sounds. No wheezing.   Musculoskeletal:         General: No deformity. Normal range of motion.      Cervical back: Normal range of motion and neck supple.   Lymphadenopathy:      Cervical: No cervical adenopathy.   Skin:     General: Skin is warm and dry.      Findings: No erythema or rash.      Nails: There is no clubbing.   Neurological:      General: No focal deficit present.      Mental Status: She is alert and oriented to person, place, and time.      Cranial Nerves: No cranial nerve deficit.      Deep Tendon Reflexes: Reflexes are normal and symmetric. Reflexes normal.   Psychiatric:         Mood and Affect: Mood normal.         Speech: Speech " normal.         Behavior: Behavior normal.         Thought Content: Thought content normal.         Judgment: Judgment normal.       Results for orders placed or performed in visit on 08/27/24   Comprehensive Metabolic Panel    Collection Time: 08/27/24 11:33 AM    Specimen: Blood   Result Value Ref Range    Glucose 88 65 - 99 mg/dL    BUN 14 8 - 23 mg/dL    Creatinine 0.84 0.57 - 1.00 mg/dL    Sodium 142 136 - 145 mmol/L    Potassium 3.8 3.5 - 5.2 mmol/L    Chloride 103 98 - 107 mmol/L    CO2 27.3 22.0 - 29.0 mmol/L    Calcium 10.4 8.6 - 10.5 mg/dL    Total Protein 7.1 6.0 - 8.5 g/dL    Albumin 4.6 3.5 - 5.2 g/dL    ALT (SGPT) 26 1 - 33 U/L    AST (SGOT) 22 1 - 32 U/L    Alkaline Phosphatase 57 39 - 117 U/L    Total Bilirubin 0.6 0.0 - 1.2 mg/dL    Globulin 2.5 gm/dL    A/G Ratio 1.8 g/dL    BUN/Creatinine Ratio 16.7 7.0 - 25.0    Anion Gap 11.7 5.0 - 15.0 mmol/L    eGFR 79.2 >60.0 mL/min/1.73   Hemoglobin A1c    Collection Time: 08/27/24 11:33 AM    Specimen: Blood   Result Value Ref Range    Hemoglobin A1C 5.20 4.80 - 5.60 %   Lipid Panel    Collection Time: 08/27/24 11:33 AM    Specimen: Blood   Result Value Ref Range    Total Cholesterol 205 (H) 0 - 200 mg/dL    Triglycerides 58 0 - 150 mg/dL    HDL Cholesterol 78 (H) 40 - 60 mg/dL    LDL Cholesterol  116 (H) 0 - 100 mg/dL    VLDL Cholesterol 11 5 - 40 mg/dL    LDL/HDL Ratio 1.48    TSH    Collection Time: 08/27/24 11:33 AM    Specimen: Blood   Result Value Ref Range    TSH 0.812 0.270 - 4.200 uIU/mL   T4, Free    Collection Time: 08/27/24 11:33 AM    Specimen: Blood   Result Value Ref Range    Free T4 1.57 0.92 - 1.68 ng/dL   Vitamin D,25-Hydroxy    Collection Time: 08/27/24 11:33 AM    Specimen: Arm, Left; Blood   Result Value Ref Range    25 Hydroxy, Vitamin D 80.1 30.0 - 100.0 ng/ml   CBC Auto Differential    Collection Time: 08/27/24 11:33 AM    Specimen: Blood   Result Value Ref Range    WBC 7.43 3.40 - 10.80 10*3/mm3    RBC 4.46 3.77 - 5.28 10*6/mm3     Hemoglobin 13.4 12.0 - 15.9 g/dL    Hematocrit 40.9 34.0 - 46.6 %    MCV 91.7 79.0 - 97.0 fL    MCH 30.0 26.6 - 33.0 pg    MCHC 32.8 31.5 - 35.7 g/dL    RDW 13.1 12.3 - 15.4 %    RDW-SD 43.7 37.0 - 54.0 fl    MPV 11.2 6.0 - 12.0 fL    Platelets 218 140 - 450 10*3/mm3    Neutrophil % 70.4 42.7 - 76.0 %    Lymphocyte % 21.8 19.6 - 45.3 %    Monocyte % 6.6 5.0 - 12.0 %    Eosinophil % 0.3 0.3 - 6.2 %    Basophil % 0.5 0.0 - 1.5 %    Immature Grans % 0.4 0.0 - 0.5 %    Neutrophils, Absolute 5.23 1.70 - 7.00 10*3/mm3    Lymphocytes, Absolute 1.62 0.70 - 3.10 10*3/mm3    Monocytes, Absolute 0.49 0.10 - 0.90 10*3/mm3    Eosinophils, Absolute 0.02 0.00 - 0.40 10*3/mm3    Basophils, Absolute 0.04 0.00 - 0.20 10*3/mm3    Immature Grans, Absolute 0.03 0.00 - 0.05 10*3/mm3    nRBC 0.0 0.0 - 0.2 /100 WBC     Diagnoses and all orders for this visit:    1. Mixed hyperlipidemia (Primary)  Assessment & Plan:  Eating low fat diet- check flp       2. Primary hypertension  Assessment & Plan:  BP is good overall- taking hctz daily     Orders:  -     [facility]; Future  -     Comprehensive Metabolic Panel; Future  -     [facility]  -     Comprehensive Metabolic Panel    3. Acquired hypothyroidism  Assessment & Plan:  Check tfts taking 88 mcg levothyroxine a/w 75 mcg levothyroxine every other day     Orders:  -     T4, Free; Future  -     TSH; Future  -     Lipid Panel; Future  -     T4, Free  -     TSH  -     Lipid Panel    4. Vitamin D deficiency  Assessment & Plan:  Update levels  Check iron, chinmay with hair loss   Consider derm referral     Orders:  -     Vitamin B12; Future  -     Vitamin D,25-Hydroxy; Future  -     Iron; Future  -     Vitamin B12  -     Vitamin D,25-Hydroxy  -     Iron    No follow-ups on file.    Electronically signed by: Berta Loja MD

## 2025-03-04 LAB
25(OH)D3 SERPL-MCNC: 66.7 NG/ML (ref 30–100)
ALBUMIN SERPL-MCNC: 4.1 G/DL (ref 3.5–5.2)
ALBUMIN/GLOB SERPL: 1.4 G/DL
ALP SERPL-CCNC: 73 U/L (ref 39–117)
ALT SERPL W P-5'-P-CCNC: 16 U/L (ref 1–33)
ANION GAP SERPL CALCULATED.3IONS-SCNC: 9.5 MMOL/L (ref 5–15)
AST SERPL-CCNC: 29 U/L (ref 1–32)
BILIRUB SERPL-MCNC: 0.6 MG/DL (ref 0–1.2)
BUN SERPL-MCNC: 11 MG/DL (ref 8–23)
BUN/CREAT SERPL: 13.8 (ref 7–25)
CALCIUM SPEC-SCNC: 10.3 MG/DL (ref 8.6–10.5)
CHLORIDE SERPL-SCNC: 104 MMOL/L (ref 98–107)
CHOLEST SERPL-MCNC: 212 MG/DL (ref 0–200)
CO2 SERPL-SCNC: 25.5 MMOL/L (ref 22–29)
CREAT SERPL-MCNC: 0.8 MG/DL (ref 0.57–1)
EGFRCR SERPLBLD CKD-EPI 2021: 83.9 ML/MIN/1.73
GLOBULIN UR ELPH-MCNC: 2.9 GM/DL
GLUCOSE SERPL-MCNC: 72 MG/DL (ref 65–99)
HDLC SERPL-MCNC: 68 MG/DL (ref 40–60)
IRON 24H UR-MRATE: 115 MCG/DL (ref 37–145)
LDLC SERPL CALC-MCNC: 130 MG/DL (ref 0–100)
LDLC/HDLC SERPL: 1.89 {RATIO}
POTASSIUM SERPL-SCNC: 4.4 MMOL/L (ref 3.5–5.2)
PROT SERPL-MCNC: 7 G/DL (ref 6–8.5)
SODIUM SERPL-SCNC: 139 MMOL/L (ref 136–145)
T4 FREE SERPL-MCNC: 1.49 NG/DL (ref 0.92–1.68)
TRIGL SERPL-MCNC: 77 MG/DL (ref 0–150)
TSH SERPL DL<=0.05 MIU/L-ACNC: 0.13 UIU/ML (ref 0.27–4.2)
VIT B12 BLD-MCNC: >2000 PG/ML (ref 211–946)
VLDLC SERPL-MCNC: 14 MG/DL (ref 5–40)

## 2025-03-04 RX ORDER — LEVOTHYROXINE SODIUM 75 UG/1
TABLET ORAL
Qty: 45 TABLET | Refills: 0 | OUTPATIENT
Start: 2025-03-04

## 2025-03-04 RX ORDER — LEVOTHYROXINE SODIUM 75 UG/1
TABLET ORAL
Qty: 45 TABLET | Refills: 5 | Status: SHIPPED | OUTPATIENT
Start: 2025-03-04

## 2025-03-04 NOTE — TELEPHONE ENCOUNTER
Rx Refill Note  Requested Prescriptions     Pending Prescriptions Disp Refills    levothyroxine (Synthroid) 75 MCG tablet 45 tablet 1     Sig: Take 1 pill every other day alternating with 88 mcg dose      Last office visit with prescribing clinician: 3/3/2025     Next office visit with prescribing clinician: 9/9/2025     Roselia Finn MA  03/04/25, 10:57 EST

## 2025-03-05 LAB
ANA SER QL: POSITIVE
DSDNA AB SER-ACNC: <1 IU/ML (ref 0–9)
Lab: NORMAL

## 2025-03-24 ENCOUNTER — RESULTS FOLLOW-UP (OUTPATIENT)
Dept: ENDOCRINOLOGY | Facility: CLINIC | Age: 60
End: 2025-03-24
Payer: COMMERCIAL

## 2025-03-24 RX ORDER — LEVOTHYROXINE SODIUM 75 UG/1
TABLET ORAL
Qty: 90 TABLET | Refills: 1 | Status: SHIPPED | OUTPATIENT
Start: 2025-03-24

## 2025-03-24 NOTE — LETTER
[facility]ory [facility]ccord  [address] [facility] [place] [address]    March 24, 2025     Dear Ms. Mccord:    Below are the results from your recent visit:    Resulted Orders   T4, Free   Result Value Ref Range    Free T4 1.49 0.92 - 1.68 ng/dL   TSH   Result Value Ref Range    TSH 0.130 (L) 0.270 - 4.200 uIU/mL   KAREN   Result Value Ref Range    KAREN Direct Positive (A) Negative   Comprehensive Metabolic Panel   Result Value Ref Range    Glucose 72 65 - 99 mg/dL    BUN 11 8 - 23 mg/dL    Creatinine 0.80 0.57 - 1.00 mg/dL    Sodium 139 136 - 145 mmol/L    Potassium 4.4 3.5 - 5.2 mmol/L      Comment:      Slight hemolysis detected by analyzer. Result may be falsely elevated.    Chloride 104 98 - 107 mmol/L    CO2 25.5 22.0 - 29.0 mmol/L    Calcium 10.3 8.6 - 10.5 mg/dL    Total Protein 7.0 6.0 - 8.5 g/dL    Albumin 4.1 3.5 - 5.2 g/dL    ALT (SGPT) 16 1 - 33 U/L    AST (SGOT) 29 1 - 32 U/L      Comment:      Slight hemolysis detected by analyzer. Result may be falsely elevated.    Alkaline Phosphatase 73 39 - 117 U/L    Total Bilirubin 0.6 0.0 - 1.2 mg/dL    Globulin 2.9 gm/dL    A/G Ratio 1.4 g/dL    BUN/Creatinine Ratio 13.8 7.0 - 25.0    Anion Gap 9.5 5.0 - 15.0 mmol/L    eGFR 83.9 >60.0 mL/min/1.73   Vitamin B12   Result Value Ref Range    Vitamin B-12 >2,000 (H) 211 - 946 pg/mL   Vitamin D,25-Hydroxy   Result Value Ref Range    25 Hydroxy, Vitamin D 66.7 30.0 - 100.0 ng/ml   Iron   Result Value Ref Range    Iron 115 37 - 145 mcg/dL   Lipid Panel   Result Value Ref Range    Total Cholesterol 212 (H) 0 - 200 mg/dL    Triglycerides 77 0 - 150 mg/dL    HDL Cholesterol 68 (H) 40 - 60 mg/dL    LDL Cholesterol  130 (H) 0 - 100 mg/dL    VLDL Cholesterol 14 5 - 40 mg/dL    LDL/HDL Ratio 1.89    REFLEXED DNA/DS   Result Value Ref Range    Anti-DNA (DS) Ab Qn <1 0 - 9 IU/mL      Comment:                                         Negative      <5                                     Equivocal  5 - 9                                     Positive       >9    See below: Comment       Comment:      Autoantibody                       Disease Association  ------------------------------------------------------------                          Condition                  Frequency  ---------------------   ------------------------   ---------  Antinuclear Antibody,    SLE, mixed connective  Direct (KAREN-D)           tissue diseases  ---------------------   ------------------------   ---------  dsDNA                    SLE                        40 - 60%  ---------------------   ------------------------   ---------  Chromatin                Drug induced SLE                90%                           SLE                        48 - 97%  ---------------------   ------------------------   ---------  SSA (Ro)                 SLE                        25 - 35%                           Sjogren's Syndrome         40 - 70%                            Lupus                 100%  ---------------------   ------------------------   ---------  SSB (La)                 SLE                              10%                           Sjogren's Syndrome              30%  ---------------------   -----------------------    ---------  Sm (anti-Smith)          SLE                        15 - 30%  ---------------------   -----------------------    ---------  RNP                      Mixed Connective Tissue                           Disease                         95%  (U1 nRNP,                SLE                        30 - 50%  anti-ribonucleoprotein)  Polymyositis and/or                           Dermatomyositis                 20%  ---------------------   ------------------------   ---------  Scl-70 (antiDNA          Scleroderma (diffuse)      20 - 35%  topoisomerase)           Crest                           13%  ---------------------   ------------------------   ---------  Yessenia-1                     Polymyositis and/or                           Dermatomyositis            20 -  40%  ---------------------   ------------------------   ---------  Centromere B             Scleroderma -  Crest                           variant                         80%       TSH is low- reduce supplement to 75 mcg daily (stop 88 mcg alternating dose)  LDL cholesterol is high- continue low fat diet and try to get 30 min of exercise daily   B12 level is high - ok to reduce supplement by half   Vitamin D and iron levels are normal   KAREN (test for screening for lupus) is high but confirmatory test is negative  We will continue to monitor this    If you have any questions or concerns, please don't hesitate to call.         Sincerely,        Berta Loja MD

## 2025-07-22 DIAGNOSIS — E03.9 ACQUIRED HYPOTHYROIDISM: Primary | ICD-10-CM

## 2025-07-24 ENCOUNTER — RESULTS FOLLOW-UP (OUTPATIENT)
Dept: ENDOCRINOLOGY | Facility: CLINIC | Age: 60
End: 2025-07-24
Payer: COMMERCIAL

## 2025-07-24 ENCOUNTER — LAB (OUTPATIENT)
Dept: ENDOCRINOLOGY | Facility: CLINIC | Age: 60
End: 2025-07-24
Payer: COMMERCIAL

## 2025-07-24 DIAGNOSIS — E03.9 ACQUIRED HYPOTHYROIDISM: ICD-10-CM

## 2025-07-24 LAB
T4 FREE SERPL-MCNC: 1.14 NG/DL (ref 0.92–1.68)
TSH SERPL DL<=0.05 MIU/L-ACNC: 2.06 UIU/ML (ref 0.27–4.2)

## 2025-07-24 PROCEDURE — 36415 COLL VENOUS BLD VENIPUNCTURE: CPT | Performed by: INTERNAL MEDICINE

## 2025-07-24 PROCEDURE — 84439 ASSAY OF FREE THYROXINE: CPT | Performed by: INTERNAL MEDICINE

## 2025-07-24 PROCEDURE — 84443 ASSAY THYROID STIM HORMONE: CPT | Performed by: INTERNAL MEDICINE

## 2025-07-24 NOTE — LETTER
Dory Mccord  [address]  [place] [address]    July 24, 2025     Dear Ms. Mccord:    Below are the results from your recent visit:    Resulted Orders   TSH   Result Value Ref Range    TSH 2.060 0.270 - 4.200 uIU/mL   T4, Free   Result Value Ref Range    Free T4 1.14 0.92 - 1.68 ng/dL       Repeat thyroid tests are normal - no changes recommended.      If you have any questions or concerns, please don't hesitate to call.         Sincerely,        Berta Loja MD